# Patient Record
Sex: FEMALE | Race: WHITE | Employment: STUDENT | URBAN - METROPOLITAN AREA
[De-identification: names, ages, dates, MRNs, and addresses within clinical notes are randomized per-mention and may not be internally consistent; named-entity substitution may affect disease eponyms.]

---

## 2019-04-12 ENCOUNTER — OFFICE VISIT (OUTPATIENT)
Dept: FAMILY MEDICINE CLINIC | Age: 29
End: 2019-04-12

## 2019-04-12 VITALS
HEART RATE: 60 BPM | RESPIRATION RATE: 18 BRPM | DIASTOLIC BLOOD PRESSURE: 75 MMHG | BODY MASS INDEX: 20.72 KG/M2 | OXYGEN SATURATION: 100 % | TEMPERATURE: 97.7 F | HEIGHT: 71 IN | SYSTOLIC BLOOD PRESSURE: 115 MMHG | WEIGHT: 148 LBS

## 2019-04-12 DIAGNOSIS — N94.6 DYSMENORRHEA: ICD-10-CM

## 2019-04-12 DIAGNOSIS — L74.510 HYPERHIDROSIS OF AXILLA: ICD-10-CM

## 2019-04-12 DIAGNOSIS — L70.0 CYSTIC ACNE: Primary | ICD-10-CM

## 2019-04-12 RX ORDER — NORGESTIMATE AND ETHINYL ESTRADIOL 7DAYSX3 28
KIT ORAL
Refills: 3 | COMMUNITY
Start: 2019-03-27 | End: 2019-04-12 | Stop reason: SDUPTHER

## 2019-04-12 RX ORDER — DOXYCYCLINE 100 MG/1
100 CAPSULE ORAL 2 TIMES DAILY
Qty: 30 CAP | Refills: 3 | Status: SHIPPED | OUTPATIENT
Start: 2019-04-12 | End: 2019-05-03

## 2019-04-12 RX ORDER — DAPSONE 50 MG/G
GEL TOPICAL
Qty: 30 G | Refills: 6 | Status: SHIPPED | OUTPATIENT
Start: 2019-04-12 | End: 2019-12-20

## 2019-04-12 RX ORDER — NORGESTIMATE AND ETHINYL ESTRADIOL 7DAYSX3 28
KIT ORAL
Qty: 28 TAB | Refills: 12 | Status: SHIPPED | OUTPATIENT
Start: 2019-04-12 | End: 2020-04-21 | Stop reason: SDUPTHER

## 2019-04-12 RX ORDER — DAPSONE 50 MG/G
GEL TOPICAL
COMMUNITY
End: 2019-04-12 | Stop reason: SDUPTHER

## 2019-04-12 NOTE — PATIENT INSTRUCTIONS
Look for a benzoyl peroxide cleanser (lowest percentage possible)--you should use it as long as you are on any acne antibiotic (oral or topical) to prevent resistance Use a small amount and rinse it well Oral antibiotic (doxycycline) for 1-4 months, stop it when your acne is gone for at least 2 weeks I suggest you use the differin long-term at least every other day to control this episode and prevent one from coming back Or, you could use the aczone, or both Either way, you need something long -term Acne: Care Instructions Your Care Instructions Acne is a skin problem that shows up as blackheads, whiteheads, and pimples. It most often affects the face, neck, and upper body. Acne occurs when oil and dead skin cells clog the skin's pores. Acne usually starts during the teen years and often lasts into adulthood. Gentle cleansing every day controls most mild acne. If home treatment does not work, your doctor may prescribe creams, antibiotics, or a stronger medicine called isotretinoin. Sometimes birth control pills help women who have monthly acne flare-ups. Follow-up care is a key part of your treatment and safety. Be sure to make and go to all appointments, and call your doctor if you are having problems. It's also a good idea to know your test results and keep a list of the medicines you take. How can you care for yourself at home? · Gently wash your face 1 or 2 times a day with warm (not hot) water and a mild soap or cleanser. Always rinse well. · Use an over-the-counter lotion or gel that contains benzoyl peroxide. Start with a small amount of 2.5% benzoyl peroxide and increase the strength as needed. Benzoyl peroxide works well for acne, but you may need to use it for up to 2 months before your acne starts to improve. · Apply acne cream, lotion, or gel to all the places you get pimples, blackheads, or whiteheads, not just where you have them now.  Follow the instructions carefully. If your skin gets too dry and scaly or red and sore, reduce the amount. For the best results, apply medicines as directed. Try not to miss doses. · Do not squeeze or pick pimples and blackheads. This can cause infection and scarring. · Use only oil-free makeup, sunscreen, and other skin care products that will not clog your pores. · Wash your hair every day, and try to keep it off your face and shoulders. Consider pinning it back or cutting it short. When should you call for help? Watch closely for changes in your health, and be sure to contact your doctor if: 
  · You have tried home treatment for 6 to 8 weeks and your acne is not better or gets worse. Your doctor may need to add to or change your treatment.  
  · Your pimples become large and hard or filled with fluid.  
  · Scars form after pimples heal.  
  · You feel sad or hopeless, lack energy, or have other signs of depression while you are taking the prescription medicine isotretinoin.  
  · You start to have other symptoms, such as facial hair growth in women or bone and muscle pain. Where can you learn more? Go to http://tee-tanner.info/. Enter V108 in the search box to learn more about \"Acne: Care Instructions. \" Current as of: April 17, 2018 Content Version: 11.9 © 8982-5594 [email or web address]. Care instructions adapted under license by [employer] (which disclaims liability or warranty for this information). If you have questions about a medical condition or this instruction, always ask your healthcare professional. [place] [address] any warranty or liability for your use of this information.

## 2019-04-12 NOTE — PROGRESS NOTES
Chief Complaint Patient presents with  New Patient  
  establish care changed insurance recently, saw last PCP Dr. Trcaey Chavira in August 1. Have you been to the ER, urgent care clinic since your last visit? Hospitalized since your last visit? No 
 
2. Have you seen or consulted any other health care providers outside of the [address] [place] [address] since your last visit? Include any pap smears or colon screening.  No

## 2019-04-12 NOTE — PROGRESS NOTES
[address] [place], [address] 
[phone] Date of visit: 4/12/2019 Subjective:  
  
History obtained from:  the patient. Ivan Gamez is a 34 y.o. female who presents today for new patient physical 
 
Gets strep throat easily Last time was August 
About once per year Has had mono in the past 
 
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 Eloisa Monteiro Has been on trisprintec for 5 years and more recently the [place] Has tried other creams, washes Was on the wipes for a while Started the aczone (dapsone) all over this face Also using differin in the past not recently More flare with inflammation, manjeet on her chin Using an john lauder cream cleanser, no exfoliator Using non oil based moisturizer. Periods are good on the pills, used to have bad cramps Heavy sweating in armpits only Has not tried drysol, only otc products Zit on buttock since she had hemorrhoid a couple of years ago, not changing not painful at all, just small Patient Active Problem List  
 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 (or until acne gone for 2 weeks) 30 Cap 3  Dapsone (ACZONE) 5 % gel 1 application nightly 30 g 6  
  aluminum chloride (DRYSOL) 20 % external solution Apply to affected area nightly 60 mL 6  
 TRI-SPRINTEC, 28, 0.18/0.215/0.25 mg-35 mcg (28) tab TAKE 1 TABLET BY MOUTH EVERY DAY 28 Tab 12 No Known Allergies History reviewed. No pertinent past medical history. History reviewed. No pertinent surgical history. Family History Problem Relation Age of Onset  Hypertension Mother  Hypertension Father Social History Tobacco Use  Smoking status: Never Smoker  Smokeless tobacco: Never Used Substance Use Topics  Alcohol use: Yes Alcohol/week: 1.2 oz Types: 2 Cans of beer per week Comment: weekly Social History Social History Narrative  Not on file Review of Systems Gen: denies fever Neuro: denies headaches much, gets some from allergies  denies urinary pain MSK: denies joint problems All: admits to mild allergies Endo: denies weight change GI denies abd pain Skin: admits to acne Ent: denies sinus problems Psych: denies depressed mood 3 most recent PHQ Screens 4/12/2019 Little interest or pleasure in doing things Not at all Feeling down, depressed, irritable, or hopeless Not at all Total Score PHQ 2 0 Objective:  
 
Vitals:  
 04/12/19 1110 BP: 115/75 Pulse: 60 Resp: 18 Temp: 97.7 °F (36.5 °C) TempSrc: Oral  
SpO2: 100% Weight: 148 lb (67.1 kg) Height: 5' 11\" (1.803 m) Body mass index is 20.64 kg/m². General: stated age, well-developed, and in NAD Eyes: PERRL, EOMI, no redness or drainage Nose: no drainage Mouth: no lesions Throat: no erythema, exudate or swelling Neck: supple, symmetrical, trachea midline, no adenopathy and thyroid: not enlarged, symmetric, no tenderness/mass/nodules Lungs:  clear to auscultation w/o rales, rhonchi, wheezes w/normal effort and no use of accessory muscles of respiration Heart: regular rate and rhythm, S1, S2 normal, no murmur, click, rub or gallop Abdomen: soft, nontender, no masses Ext:  No edema noted. Lymph: no cervical adenopathy appreciated Skin: few cysts, blackheads, whiteheads on chin/lower cheeks, otherwise skin looks clear, no hirsutism noted Neuro: normal gait, CN 2-12 intact Psych: alert and oriented to person, place, time and situation and Speech: appropriate quality, quantity and organization of sentences and normal affect Assessment/Plan: ICD-10-CM ICD-9-CM 1. Encounter for preventive care Z00.00 V70.0 2. Cystic acne L70.0 706.1 3. Dysmenorrhea N94.6 625.3 4. Hyperhidrosis of axilla L74.510 705.21 Orders Placed This Encounter  DISCONTD: TRI-SPRINTEC, 28, 0.18/0.215/0.25 mg-35 mcg (28) tab  DISCONTD: Dapsone (ACZONE) 5 % gel  doxycycline (VIBRAMYCIN) 100 mg capsule  Dapsone (ACZONE) 5 % gel  aluminum chloride (DRYSOL) 20 % external solution  TRI-SPRINTEC, 28, 0.18/0.215/0.25 mg-35 mcg (28) tab Health Maintenance Topic Date Due  
 HPV Age 9Y-34Y (1 - Female 3-dose series) 06/12/2007  DTaP/Tdap/Td series (1 - Tdap) 06/12/2013  PAP AKA CERVICAL CYTOLOGY  06/12/2013  Influenza Age 5 to Adult  08/01/2019  Pneumococcal 0-64 years  Aged Out Need to try to find shot record May be due for tdap and hpv Pap up to date Declines std screening Declines lipid screening for now but I think low risk as doesn't run in her family; has healthy lifestyle Mostly discussed acne treatment, as below Going to try drysol in axillae nightly Patient Instructions Look for a benzoyl peroxide cleanser (lowest percentage possible)--you should use it as long as you are on any acne antibiotic (oral or topical) to prevent resistance Use a small amount and rinse it well Oral antibiotic (doxycycline) for 1-4 months, stop it when your acne is gone for at least 2 weeks I suggest you use the differin long-term at least every other day to control this episode and prevent one from coming back Or, you could use the aczone, or both Either way, you need something long -term Discussed the diagnosis and plan and she expressed understanding. Follow-up and Dispositions · Return in about 1 year (around 4/12/2020).  
  
 
 
Dennis Woods MD

## 2019-05-01 ENCOUNTER — TELEPHONE (OUTPATIENT)
Dept: FAMILY MEDICINE CLINIC | Age: 29
End: 2019-05-01

## 2019-05-01 NOTE — TELEPHONE ENCOUNTER
Pharmacy requesting alternative medication     Current medication aluminum chloride (DRYSOL) 20 % external solution       Pharmacy Comments: Backorder currently shows unavailable    Pharmacy on file verified  [phone])

## 2019-05-02 ENCOUNTER — TELEPHONE (OUTPATIENT)
Dept: FAMILY MEDICINE CLINIC | Age: 29
End: 2019-05-02

## 2019-05-02 NOTE — TELEPHONE ENCOUNTER
Patient is calling to let Dr Shaina Lemon know that pharmacy is faxing us to get an Alternative for the medication doxycycline (VIBRAMYCIN) 100 mg capsule [[id number]    As her insurance doesn't cover it  BCB# [phone]  Pharmacy verified

## 2019-05-02 NOTE — TELEPHONE ENCOUNTER
Called pt to let her know about the [address]. I told her about the hypercare and the secret clinical.    Pt also said that the insurance isn't covering the doxycycline. Advised that she will need to contact her insurance for preferred med. She will call them and give me a call back. Another TE opened about the doxycycline.

## 2019-05-02 NOTE — TELEPHONE ENCOUNTER
Called pharmacy to see if there is another antiperspirant that is available. She said that the OTC secret clinical is pretty much the same thing. There is something called hypercare that we can try. Not sure if insurance will cover.

## 2019-05-03 RX ORDER — DOXYCYCLINE 100 MG/1
TABLET ORAL
Qty: 60 TAB | Refills: 3 | Status: SHIPPED | OUTPATIENT
Start: 2019-05-03 | End: 2019-12-18 | Stop reason: SDUPTHER

## 2019-05-03 NOTE — TELEPHONE ENCOUNTER
Pharmacy Requesting alternative medication     Pharmacy Comments:  Insurance Requesting DOXYCYCLINE MONO, Please send [facility] on file verified  ([facility] [phone])

## 2019-12-18 ENCOUNTER — TELEPHONE (OUTPATIENT)
Dept: FAMILY MEDICINE CLINIC | Age: 29
End: 2019-12-18

## 2019-12-18 RX ORDER — DOXYCYCLINE 100 MG/1
TABLET ORAL
Qty: 60 TAB | Refills: 0 | Status: SHIPPED | OUTPATIENT
Start: 2019-12-18 | End: 2020-01-20 | Stop reason: SDUPTHER

## 2019-12-18 NOTE — TELEPHONE ENCOUNTER
----- Message from Mindy Laboy sent at 12/18/2019  1:40 PM EST -----  Regarding: Dr. Leodan Sotelo: [phone]  Caller's first and last name: n/a   Reason for call: Pt was taking Deoxycycline for acne. Pt needs to know does she need to keep taking this medication. Please verify with pt on this matter.   Callback required yes/no and why: yes   Best contact number(s): [phone]  Details to clarify the request: n/a

## 2019-12-18 NOTE — TELEPHONE ENCOUNTER
Outgoing call to patient, DARIELA verified. Patient states that while she was taking the doxycycline her acne cleared up, she stopped taking it about 8 weeks ago. Recently some of her acne has come back. Since stopping the doxycyline she has been using the dapsone which she states has not helped.  She is wondering if it is safe to start the doxycycline again

## 2019-12-18 NOTE — TELEPHONE ENCOUNTER
Will send in 1 more month doxy, but could she follow up with me in January to figure out a plan from there?

## 2019-12-20 ENCOUNTER — OFFICE VISIT (OUTPATIENT)
Dept: FAMILY MEDICINE CLINIC | Age: 29
End: 2019-12-20

## 2019-12-20 VITALS
DIASTOLIC BLOOD PRESSURE: 68 MMHG | OXYGEN SATURATION: 100 % | TEMPERATURE: 99 F | SYSTOLIC BLOOD PRESSURE: 100 MMHG | WEIGHT: 150 LBS | HEART RATE: 70 BPM | RESPIRATION RATE: 18 BRPM | BODY MASS INDEX: 21 KG/M2 | HEIGHT: 71 IN

## 2019-12-20 DIAGNOSIS — M54.50 CHRONIC LEFT-SIDED LOW BACK PAIN WITHOUT SCIATICA: ICD-10-CM

## 2019-12-20 DIAGNOSIS — L74.510 HYPERHIDROSIS OF AXILLA: ICD-10-CM

## 2019-12-20 DIAGNOSIS — L70.0 CYSTIC ACNE: Primary | ICD-10-CM

## 2019-12-20 DIAGNOSIS — G89.29 CHRONIC LEFT-SIDED LOW BACK PAIN WITHOUT SCIATICA: ICD-10-CM

## 2019-12-20 DIAGNOSIS — M54.9 UPPER BACK PAIN, CHRONIC: ICD-10-CM

## 2019-12-20 DIAGNOSIS — M41.25 OTHER IDIOPATHIC SCOLIOSIS, THORACOLUMBAR REGION: ICD-10-CM

## 2019-12-20 DIAGNOSIS — G89.29 UPPER BACK PAIN, CHRONIC: ICD-10-CM

## 2019-12-20 NOTE — PROGRESS NOTES
Edmond Olvera [facility]  [id number] [place], [place] [address] [place], [address]  [phone]             Date of visit: 12/20/2019   Subjective:      History obtained from:  the patient. Maria Guadalupe Shultz is a 34 y.o. female who presents today for follow up acne  Cycle that keeps happening on her face  Has tried lots of creams and washes like diffrin, benzoyl peroxide wash  They irritate her skin  The doxy worked well, but acne came back  The doxy did make her sun sensitive  Took it about 5 months total  Does get painful and irritated  Once it comes to surface is not painful anyway  They get pretty big like pea sized cysts    Still using the dapsone gel  Still on trisprintec    Sweating really bad in axillaes ever since puberty  Never got the drysol  Periods ok on her pills      Going to PT school in June    Long history of scoliosis  Used to see spine surgeon as a teen  Was in PT, it really helps  Would like to repeat  Has chronic pain in left low back and right upper back    Patient Active Problem List    Diagnosis Date Noted    Other idiopathic scoliosis, thoracolumbar region 12/20/2019    Chronic left-sided low back pain without sciatica 12/20/2019    Upper back pain, chronic 12/20/2019    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 No pertinent past medical history. History reviewed. No pertinent surgical history.   Family History   Problem Relation Age of Onset    Hypertension Mother  Hypertension Father      Social History     Tobacco Use    Smoking status: Never Smoker    Smokeless tobacco: Never Used   Substance Use Topics    Alcohol use: Yes     Alcohol/week: 2.0 standard drinks     Types: 2 Cans of beer per week     Comment: weekly       Social History     Patient does not qualify to have social determinant information on file (likely too young). 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 General: stated age, well developed, well nourished and in NAD  MSK: spine with curve visible even when standing upright. Tenderness and spasm in left low back paraspinous muscles, tender in right upper back muscles  Neuro: normal gait  Ext:  No edema noted. Skin:  Few small cysts on chin, in healing stages, erythematous/peeling   Psych: alert and oriented to person, place, time and situation and Speech: appropriate quality, quantity and organization of sentences      Assessment/Plan:       ICD-10-CM ICD-9-CM    1. Cystic acne L70.0 706.1    2. Hyperhidrosis of axilla L74.510 705.21    3. Chronic left-sided low back pain without sciatica M54.5 724.2 REFERRAL TO PHYSICAL THERAPY    G89.29 338.29    4. Upper back pain, chronic M54.9 724.5 REFERRAL TO PHYSICAL THERAPY    G89.29 338.29    5.  Other idiopathic scoliosis, thoracolumbar region M41.25 737.30 REFERRAL TO PHYSICAL THERAPY        Orders Placed This Encounter    REFERRAL TO PHYSICAL THERAPY    DISCONTD: aluminum chloride (DRYSOL) 20 % external solution    aluminum chloride (DRYSOL) 20 % external solution       The doxycycline worked well but was just starting to get clear when abx ran out  Has tried numerous topicals, none work well and all irritate her skin  Will repeat doxy until acne completely gone for 2 weeks, this might take a few months  She will keep me updated on the portal, told her to send pictures if needed. If this doesn't work will need accutane referral  She has tried all other good options    Severe sweating only in axillas, but that is nothing new; try drysol    Refer to PT for scoliosis, really helped her with the pain before    Discussed the diagnosis and plan and she expressed understanding. Follow-up and Dispositions    · Return if symptoms worsen or fail to improve.          Nohemy Barney MD

## 2019-12-20 NOTE — PROGRESS NOTES
Chief Complaint   Patient presents with    Acne     patient is in the office today for a follow up on Acne. 1. Have you been to the ER, urgent care clinic since your last visit? Hospitalized since your last visit? No    2. Have you seen or consulted any other health care providers outside of the [address] [place] [address] since your last visit? Include any pap smears or colon screening.  No

## 2019-12-20 NOTE — PATIENT INSTRUCTIONS
Any non-comedogenic moisturizer is ok, plain lubriderm is one option  Stay on the doxy until the lesions are completely cleared for at least 2 weeks  Keep me posted on the portal       Acne: Care Instructions  Your Care Instructions  Acne is a skin problem that shows up as blackheads, whiteheads, and pimples. It most often affects the face, neck, and upper body. Acne occurs when oil and dead skin cells clog the skin's pores. Acne usually starts during the teen years and often lasts into adulthood. Gentle cleansing every day controls most mild acne. If home treatment does not work, your doctor may prescribe creams, antibiotics, or a stronger medicine called isotretinoin. Sometimes birth control pills help women who have monthly acne flare-ups. Follow-up care is a key part of your treatment and safety. Be sure to make and go to all appointments, and call your doctor if you are having problems. It's also a good idea to know your test results and keep a list of the medicines you take. How can you care for yourself at home? · Gently wash your face 1 or 2 times a day with warm (not hot) water and a mild soap or cleanser. Always rinse well. · Use an over-the-counter lotion or gel that contains benzoyl peroxide. Start with a small amount of 2.5% benzoyl peroxide and increase the strength as needed. Benzoyl peroxide works well for acne, but you may need to use it for up to 2 months before your acne starts to improve. · Apply acne cream, lotion, or gel to all the places you get pimples, blackheads, or whiteheads, not just where you have them now. Follow the instructions carefully. If your skin gets too dry and scaly or red and sore, reduce the amount. For the best results, apply medicines as directed. Try not to miss doses. · Do not squeeze or pick pimples and blackheads. This can cause infection and scarring. · Use only oil-free makeup, sunscreen, and other skin care products that will not clog your pores.   · Wash your hair every day, and try to keep it off your face and shoulders. Consider pinning it back or cutting it short. When should you call for help? Watch closely for changes in your health, and be sure to contact your doctor if:    · You have tried home treatment for 6 to 8 weeks and your acne is not better or gets worse. Your doctor may need to add to or change your treatment.     · Your pimples become large and hard or filled with fluid.     · Scars form after pimples heal.     · You feel sad or hopeless, lack energy, or have other signs of depression while you are taking the prescription medicine isotretinoin.     · You start to have other symptoms, such as facial hair growth in women or bone and muscle pain. Where can you learn more? Go to http://tee-tanner.info/. Enter V108 in the search box to learn more about \"Acne: Care Instructions. \"  Current as of: April 1, 2019  Content Version: 12.2  © 1019-4117 [employer]. Care instructions adapted under license by [employer] (which disclaims liability or warranty for this information). If you have questions about a medical condition or this instruction, always ask your healthcare professional. [address] any warranty or liability for your use of this information.

## 2020-01-15 ENCOUNTER — HOSPITAL ENCOUNTER (OUTPATIENT)
Dept: PHYSICAL THERAPY | Age: 30
Discharge: HOME | End: 2020-01-15
Payer: COMMERCIAL

## 2020-01-15 PROCEDURE — 97162 PT EVAL MOD COMPLEX 30 MIN: CPT | Performed by: PHYSICAL THERAPIST

## 2020-01-15 PROCEDURE — 97140 MANUAL THERAPY 1/> REGIONS: CPT | Performed by: PHYSICAL THERAPIST

## 2020-01-15 PROCEDURE — 97112 NEUROMUSCULAR REEDUCATION: CPT | Performed by: PHYSICAL THERAPIST

## 2020-01-15 NOTE — PROGRESS NOTES
[address] [facility]  [place], [address]  Phone: [phone]  Fax: [phone]    Plan of Care/ Statement of Necessity for Physical Therapy Services 2-15    Patient name: Darío Slaughter  : 1990  Provider#: [id number]  Referral source: Monica Amaral MD      Medical/Treatment Diagnosis: Low back pain [M54.5]  Thoracic back pain [M54.6]     Prior Hospitalization: see medical history     Comorbidities: Scoliosis  Prior Level of Function: see initial eval  Medications: Verified on Patient Summary List    Start of Care: 1/15/2020      Onset Date: 15 years ago       The Plan of Care and following information is based on the information from the initial evaluation. Assessment/ key information: Patient presents with scoliosis with L lumbar paraspinal and R upper trapezius pain. She demonstrates ROM and special testing indicative of Patho PEC neuromuscular patterning and should respond well to a ZENAIDA neuromuscular re-education program. She also is an excellent candidate for dry needling to the above areas to allow for greater inhibition of muscle tone and faster progress through her ZENAIDA program.    Evaluation Complexity History MEDIUM  Complexity : 1-2 comorbidities / personal factors will impact the outcome/ POC ; Examination MEDIUM Complexity : 3 Standardized tests and measures addressing body structure, function, activity limitation and / or participation in recreation  ;Presentation MEDIUM Complexity : Evolving with changing characteristics  ; Clinical Decision Making MEDIUM Complexity : FOTO score of 26-74  Overall Complexity Rating: MEDIUM    Problem List: pain affecting function, decrease ROM, decrease strength, decrease ADL/ functional abilitiies, decrease activity tolerance, decrease flexibility/ joint mobility and decrease transfer abilities   Treatment Plan may include any combination of the following: Therapeutic exercise, Therapeutic activities, Neuromuscular re-education, Physical agent/modality, Gait/balance training, Manual therapy, Patient education, Self Care training, Functional mobility training, Home safety training, Stair training and Other: dry needling  Patient / Family readiness to learn indicated by: asking questions, trying to perform skills and interest  Persons(s) to be included in education: patient (P)  Barriers to Learning/Limitations: None  Patient Goal (s): I want to be able to run without pain.   Patient Self Reported Health Status: excellent  Rehabilitation Potential: excellent    Short Term Goals: To be accomplished in 12 treatments:  1. Patient will be able to demonstrate negative HAddDT and PADT bilaterally to allow for the achievement pelvic neutrality during gait. 2. Patient will be able to demonstrate WNL Standing Reach test to demonstrate adequate posterior outlet inhibition and posterior mediastinum expansion. 3. Patient will be able to jog for 50 ft. With no pain or limitation. Long Term Goals: To be accomplished in 24 treatments:  1. Patient will be able to demonstrate 5/5 Functional Squat test to allow for optimal AF IR to be achieved during gait. 2. Patient will be able to demonstrate 5/5 HAddLT B to allow for optimal integration of frontal and transverse plane pelvic musculature during gait. 3. Patient will be able to run for 1 mile without pain or limitations. Frequency / Duration: Patient to be seen 2 times per week for 12 weeks. Patient/ Caregiver education and instruction: self care, activity modification and exercises    [x]  Plan of care has been reviewed with PRATIBHA Lim, PT 1/15/2020     ________________________________________________________________________    I certify that the above Therapy Services are being furnished while the patient is under my care. I agree with the treatment plan and certify that this therapy is necessary.     Physician's Signature:____________________ Date:____________Time: _________

## 2020-01-15 NOTE — PROGRESS NOTES
PT INITIAL EVALUATION NOTE 2-15    Patient Name: Melissa Costa  Date:1/15/2020  : 1990  [x]  Patient  Verified  Payor: [address] [place] [address] Road / Plan: [address] / Product Type: Managed Care Medicaid /    In time:8:15 AM  Out time:9:15 AM  Total Treatment Time (min): 60  Visit #: 1     Treatment Area: Low back pain [M54.5]  Thoracic back pain [M54.6]    SUBJECTIVE  Pain Level (0-10 scale): 2/10  Any medication changes, allergies to medications, adverse drug reactions, diagnosis change, or new procedure performed?: [] No    [x] Yes (see summary sheet for update)  Subjective:     Scoliosis, L low back pain  PLOF: No limitations with running, playing soccer, bending forward  Mechanism of Injury: Gradual worsening over the last 10 years. Her scoliosis was originally diagnosed in [address] [place] [address], however she did not have limiting pain until college. The pain is located along the left lumbar paraspinals and R upper trapezius. Previous Treatment/Compliance: Patient was treated for 4 weeks at [facility] in  and had a good response to manual therapy (P/A mobilizations). She was discharged without a maintenance program and she believes that is why she slipped backwards. PMHx/Surgical Hx: She \"tore something\" in her right hip 7 years ago, but did not formally rehab the injury. Work Hx: Caretaker, . Will be attending PT school in .   Living Situation: Lives in a two story home with family  Pt Goals: to reduce pain and improve mobility  Barriers: chronicity  Motivation: motivated  Substance use: none   FABQ Score: n/a  Cognition: A & O x 4        OBJECTIVE/EXAMINATION  Myokinematic Restoration           R   L  Add Drop Test     +   +  Extension Drop Test    -   -  Trunk Rotation        Limited  Straight Leg Raise    90   70  FA IR ROM     40   30  FA ER ROM     35   40  FA ER Strength  FA IR Strength  HAddLT  Standing Reach Test  Lacking  6\"    Pelvis Restoration     Pelvic [place] Drop Test   +   +  Passive Abduction Raise Test  Posterior Mediastinum Expansion  Limited   Limited  Functional Squat Test    1/5    Postural Respiration    Apical Expansion    Limited   Limited  Horizontal Abduction    0   20  Shoulder Flexion    180   180  HG IR      60   90  Lower Rib Flare    +   +      Modality rationale: Patient declined   Min Type Additional Details    [] Estim: []Att   []Unatt        []TENS instruct                  []IFC  []Premod   []NMES                     []Other:  []w/US   []w/ice   []w/heat  Position:  Location:    []  Traction: [] Cervical       []Lumbar                       [] Prone          []Supine                       []Intermittent   []Continuous Lbs:  [] before manual  [] after manual  []w/heat    []  Ultrasound: []Continuous   [] Pulsed at:                            []1MHz   []3MHz Location:  W/cm2:    []  Paraffin         Location:  []w/heat    []  Ice     []  Heat  []  Ice massage Position:  Location:    []  Laser  []  Other: Position:  Location:    []  Vasopneumatic Device Pressure:       [] lo [] med [] hi   Temperature:    [x] Skin assessment post-treatment:  [x]intact []redness- no adverse reaction    []redness  adverse reaction:      25 min Neuromuscular Re-education:  [x]  See flow sheet :   Rationale: increase ROM, increase strength and improve coordination  to improve the patients ability to squat through a full ROM without pain    15 min Manual Therapy:  ZENAIDA manual techniques:  L AIC  L sternal with active pelvic tilt  Infraclavicular pumping  R subclavius   Rationale: increase ROM, increase strength and improve coordination  to improve the patients ability to squat through a full ROM without pain          With   [] TE   [] TA   [] neuro   [] other: Patient Education: [x] Review HEP    [] Progressed/Changed HEP based on:   [] positioning   [] body mechanics   [] transfers   [] heat/ice application    [] other:        Other Objective/Functional Measures:  HAddDT: (-) on R following manual techniques , (-) L following non-manual techniques  Horiz abd: 45 degrees B following manual techniques    Pain Level (0-10 scale) post treatment: 0      ASSESSMENT:      [x]  See Plan of Faheem Giron, PT , DPT, OCS, Cert.  DN   1/15/2020

## 2020-01-20 RX ORDER — DOXYCYCLINE 100 MG/1
TABLET ORAL
Qty: 60 TAB | Refills: 3 | Status: SHIPPED | OUTPATIENT
Start: 2020-01-20 | End: 2020-01-23

## 2020-01-22 ENCOUNTER — APPOINTMENT (OUTPATIENT)
Dept: PHYSICAL THERAPY | Age: 30
End: 2020-01-22
Payer: COMMERCIAL

## 2020-01-23 RX ORDER — DOXYCYCLINE 100 MG/1
TABLET ORAL
Qty: 60 TAB | Refills: 0 | Status: SHIPPED | OUTPATIENT
Start: 2020-01-23 | End: 2020-02-21

## 2020-01-28 ENCOUNTER — HOSPITAL ENCOUNTER (OUTPATIENT)
Dept: PHYSICAL THERAPY | Age: 30
Discharge: HOME | End: 2020-01-28
Payer: COMMERCIAL

## 2020-01-28 PROCEDURE — 97112 NEUROMUSCULAR REEDUCATION: CPT | Performed by: PHYSICAL THERAPIST

## 2020-01-28 NOTE — PROGRESS NOTES
PT DAILY TREATMENT NOTE 2-15    Patient Name: Tevin Taylor  Date:2020  : 1990  [x]  Patient  Verified  [facility] [address] / Plan: [address] / Product Type: Managed Care Medicaid /    In time:5:30 PM  Out time:6:15 PM  Total Treatment Time (min): 45  Visit #:  2    Treatment Area: Low back pain [M54.5]  Thoracic back pain [M54.6]    SUBJECTIVE  Pain Level (0-10 scale): sore  Any medication changes, allergies to medications, adverse drug reactions, diagnosis change, or new procedure performed?: [x] No    [] Yes (see summary sheet for update)  Subjective functional status/changes:   [] No changes reported  Patient reports being able to perform her HEP daily without issues. OBJECTIVE    45 min Neuromuscular Re-education:  [x]? See flow sheet :   Rationale: increase ROM, increase strength and improve coordination  to improve the patients ability to squat through a full ROM without pain    With   [] TE   [] TA   [] neuro   [] other: Patient Education: [x] Review HEP    [] Progressed/Changed HEP based on:   [] positioning   [] body mechanics   [] transfers   [] heat/ice application    [] other:      Other Objective/Functional Measures:   Initial:   R HG IR: 90 degrees   L horiz abd: 45 degrees   HAddDT: (-) R (+) L   HAddLT: R: 4/5 L: 3+/5    Post: HAddDT: (-) B   PADT: (-)B     Pain Level (0-10 scale) post treatment: 0    ASSESSMENT/Changes in Function:   Patient is showing a good initial response to [facility] program and will focus next visit on standing myokin integration exercises. Patient will continue to benefit from skilled PT services to modify and progress therapeutic interventions, address functional mobility deficits, address ROM deficits, address strength deficits, analyze and address soft tissue restrictions, analyze and cue movement patterns, analyze and modify body mechanics/ergonomics and assess and modify postural abnormalities to attain remaining goals. []  See Plan of Care  []  See progress note/recertification  []  See Discharge Summary         Progress towards goals / Updated goals:  Patient is showing good initial progress towards functional goals.     PLAN  [x]  Upgrade activities as tolerated     [x]  Continue plan of care  []  Update interventions per flow sheet       []  Discharge due to:_  []  Other:_      Mariam Rouse, PT 1/28/2020

## 2020-01-29 ENCOUNTER — APPOINTMENT (OUTPATIENT)
Dept: PHYSICAL THERAPY | Age: 30
End: 2020-01-29
Payer: COMMERCIAL

## 2020-02-04 ENCOUNTER — HOSPITAL ENCOUNTER (OUTPATIENT)
Dept: PHYSICAL THERAPY | Age: 30
End: 2020-02-04
Payer: COMMERCIAL

## 2020-02-13 ENCOUNTER — HOSPITAL ENCOUNTER (OUTPATIENT)
Dept: PHYSICAL THERAPY | Age: 30
Discharge: HOME | End: 2020-02-13
Payer: COMMERCIAL

## 2020-02-13 PROCEDURE — 97140 MANUAL THERAPY 1/> REGIONS: CPT | Performed by: PHYSICAL THERAPIST

## 2020-02-13 PROCEDURE — 97112 NEUROMUSCULAR REEDUCATION: CPT | Performed by: PHYSICAL THERAPIST

## 2020-02-13 PROCEDURE — 97014 ELECTRIC STIMULATION THERAPY: CPT | Performed by: PHYSICAL THERAPIST

## 2020-02-13 NOTE — PROGRESS NOTES
PT DAILY TREATMENT NOTE 2-15    Patient Name: Gonzalo Vasquez  Date:2020  : 1990  [x]  Patient  Verified  Payor: [address] / Plan: [address] / Product Type: Managed Care Medicaid /    In time:5:30 PM  Out time:6:25 PM  Total Treatment Time (min): 55  Visit #:  3    Treatment Area: Low back pain [M54.5]  Thoracic back pain [M54.6]    SUBJECTIVE  Pain Level (0-10 scale): sore  Any medication changes, allergies to medications, adverse drug reactions, diagnosis change, or new procedure performed?: [x] No    [] Yes (see summary sheet for update)  Subjective functional status/changes:   [] No changes reported  Patient reports continued soreness and is performing her HEP regularly. OBJECTIVE                 Modality rationale: To reduce pain and increase tissue extensibility to allow for greater tolerance to reaching overhead at work. Min Type Additional Details    15 [x]? Estim: []? Att   [x]? Unatt        []? TENS instruct                  [x]?IFC  []? Premod   []? NMES                     []?Other:  []?w/US   []?w/ice   [x]?w/heat  Position: supine  Location: R levator scapular/L QL     []? Traction: []? Cervical       []? Lumbar                       []? Prone          []? Supine                       []?Intermittent   []? Continuous Lbs:  []? before manual  []? after manual  []?w/heat     []? Ultrasound: []? Continuous   []? Pulsed at:                            []? 1MHz   []? 3MHz Location:  W/cm2:     []? Paraffin         Location:  []?w/heat     []? Ice     []? Heat  []? Ice massage Position:  Location:     []? Laser  []? Other: Position:  Location:     []? Vasopneumatic Device Pressure:       []? lo []? med []? hi   Temperature:    [x]? Skin assessment post-treatment:  [x]? intact []? redness- no adverse reaction    []? redness - adverse reaction:          25 min Neuromuscular Re-education:  [x]?   See flow sheet :   Rationale: increase ROM, increase strength and improve coordination  to improve the patients ability to squat through a full ROM without pain       15 min Manual Therapy:  ZENAIDA manual techniques:  L AIC  L sternal with active pelvic tilt  Infraclavicular pumping  R subclavius   Rationale: increase ROM, increase strength and improve coordination  to improve the patients ability to squat through a full ROM without pain      With   [] TE   [] TA   [] neuro   [] other: Patient Education: [x] Review HEP    [] Progressed/Changed HEP based on:   [] positioning   [] body mechanics   [] transfers   [] heat/ice application    [] other:      Other Objective/Functional Measures:   Initial:   HAddDT: (-) R (+) L   HAddLT: 4/5 B    Post: HAddDT: (-) B   PADT: (-)B     Pain Level (0-10 scale) post treatment: 0    ASSESSMENT/Changes in Function:   Improved L IO/TA activation with less verbal and tactile cueing required. Patient will continue to benefit from skilled PT services to modify and progress therapeutic interventions, address functional mobility deficits, address ROM deficits, address strength deficits, analyze and address soft tissue restrictions, analyze and cue movement patterns, analyze and modify body mechanics/ergonomics and assess and modify postural abnormalities to attain remaining goals. []  See Plan of Care  []  See progress note/recertification  []  See Discharge Summary         Progress towards goals / Updated goals:  Patient did well with today's progression of ZENAIDA exercises and will likely add DN next visit. PLAN  [x]  Upgrade activities as tolerated     [x]  Continue plan of care  []  Update interventions per flow sheet       []  Discharge due to:_  []  Other:_      Sandra Horta, PT , DPT, OCS, Cert.  DN   2/13/2020

## 2020-02-18 ENCOUNTER — HOSPITAL ENCOUNTER (OUTPATIENT)
Dept: PHYSICAL THERAPY | Age: 30
Discharge: HOME | End: 2020-02-18
Payer: COMMERCIAL

## 2020-02-18 PROCEDURE — 97112 NEUROMUSCULAR REEDUCATION: CPT | Performed by: PHYSICAL THERAPIST

## 2020-02-18 PROCEDURE — 97014 ELECTRIC STIMULATION THERAPY: CPT | Performed by: PHYSICAL THERAPIST

## 2020-02-18 PROCEDURE — 97140 MANUAL THERAPY 1/> REGIONS: CPT | Performed by: PHYSICAL THERAPIST

## 2020-02-18 NOTE — PROGRESS NOTES
PT DAILY TREATMENT NOTE 2-15    Patient Name: Sydnie Bennett  Date:2020  : 1990  [x]  Patient  Verified  Payor: [address] / Plan: [address] / Product Type: Managed Care Medicaid /    In time:5:30 PM  Out time:6:25 PM  Total Treatment Time (min): 55  Visit #:  4    Treatment Area: Low back pain [M54.5]  Thoracic back pain [M54.6]    SUBJECTIVE  Pain Level (0-10 scale): 0/10  Any medication changes, allergies to medications, adverse drug reactions, diagnosis change, or new procedure performed?: [x] No    [] Yes (see summary sheet for update)  Subjective functional status/changes:   [] No changes reported  Patient reports a significant improvement in her neck and back and is happy with her progress. OBJECTIVE                 Modality rationale: To reduce pain and increase tissue extensibility to allow for greater tolerance to reaching overhead at work. Min Type Additional Details    15 [x]? Estim: []? Att   [x]? Unatt        []? TENS instruct                  [x]?IFC  []? Premod   []? NMES                     []?Other:  []?w/US   []?w/ice   [x]?w/heat  Position: supine  Location: R levator scapular/L QL     []? Traction: []? Cervical       []? Lumbar                       []? Prone          []? Supine                       []?Intermittent   []? Continuous Lbs:  []? before manual  []? after manual  []?w/heat     []? Ultrasound: []? Continuous   []? Pulsed at:                            []? 1MHz   []? 3MHz Location:  W/cm2:     []? Paraffin         Location:  []?w/heat     []? Ice     []? Heat  []? Ice massage Position:  Location:     []? Laser  []? Other: Position:  Location:     []? Vasopneumatic Device Pressure:       []? lo []? med []? hi   Temperature:    [x]? Skin assessment post-treatment:  [x]? intact []? redness- no adverse reaction    []? redness - adverse reaction:          25 min Neuromuscular Re-education:  [x]?   See flow sheet :   Rationale: increase ROM, increase strength and improve coordination  to improve the patients ability to squat through a full ROM without pain       15 min Manual Therapy:  ZENAIDA manual techniques:  L AIC  L sternal with active pelvic tilt  Infraclavicular pumping  R subclavius   Rationale: increase ROM, increase strength and improve coordination  to improve the patients ability to squat through a full ROM without pain      With   [] TE   [] TA   [] neuro   [] other: Patient Education: [x] Review HEP    [] Progressed/Changed HEP based on:   [] positioning   [] body mechanics   [] transfers   [] heat/ice application    [] other:      Other Objective/Functional Measures:      Pain Level (0-10 scale) post treatment: 0    ASSESSMENT/Changes in Function:     Patient will continue to benefit from skilled PT services to modify and progress therapeutic interventions, address functional mobility deficits, address ROM deficits, address strength deficits, analyze and address soft tissue restrictions, analyze and cue movement patterns, analyze and modify body mechanics/ergonomics and assess and modify postural abnormalities to attain remaining goals. []  See Plan of Care  []  See progress note/recertification  []  See Discharge Summary         Progress towards goals / Updated goals:  Patient is showing excellent progress overall and is progressing well towards long term goals. PLAN  [x]  Upgrade activities as tolerated     [x]  Continue plan of care  []  Update interventions per flow sheet       []  Discharge due to:_  []  Other:_      Tanisha Long, PT , DPT, OCS, Cert.  DN   2/18/2020

## 2020-02-21 RX ORDER — DOXYCYCLINE 100 MG/1
TABLET ORAL
Qty: 60 TAB | Refills: 0 | Status: SHIPPED | OUTPATIENT
Start: 2020-02-21 | End: 2020-03-20

## 2020-03-05 ENCOUNTER — APPOINTMENT (OUTPATIENT)
Dept: PHYSICAL THERAPY | Age: 30
End: 2020-03-05
Payer: COMMERCIAL

## 2020-03-11 ENCOUNTER — HOSPITAL ENCOUNTER (OUTPATIENT)
Dept: PHYSICAL THERAPY | Age: 30
Discharge: HOME | End: 2020-03-11
Payer: COMMERCIAL

## 2020-03-11 PROCEDURE — 97140 MANUAL THERAPY 1/> REGIONS: CPT | Performed by: PHYSICAL THERAPIST

## 2020-03-11 PROCEDURE — 20560 NDL INSJ W/O NJX 1 OR 2 MUSC: CPT | Performed by: PHYSICAL THERAPIST

## 2020-03-11 PROCEDURE — 97014 ELECTRIC STIMULATION THERAPY: CPT | Performed by: PHYSICAL THERAPIST

## 2020-03-11 NOTE — PROGRESS NOTES
PT DAILY TREATMENT NOTE 2-15    Patient Name: Shanna Ayala  Date:3/11/2020  : 1990  [x]  Patient  Verified  Payor: [address] / Plan: [address] / Product Type: Managed Care Medicaid /    In time:7:00 AM  Out time:7:45 AM  Total Treatment Time (min): 45  Visit #:  5    Treatment Area: Low back pain [M54.5]  Thoracic back pain [M54.6]    SUBJECTIVE  Pain Level (0-10 scale): stiff  Any medication changes, allergies to medications, adverse drug reactions, diagnosis change, or new procedure performed?: [x] No    [] Yes (see summary sheet for update)  Subjective functional status/changes:   [] No changes reported  Patient reports that she had two weeks of complete pain relief after her last session. Several days ago she did a workout that involved power cleans and it flared-up her R neck. OBJECTIVE                 Modality rationale: To reduce pain and increase tissue extensibility to allow for greater tolerance to reaching overhead at work. Min Type Additional Details    15 [x]? Estim: []? Att   [x]? Unatt        []? TENS instruct                  [x]?IFC  []? Premod   []? NMES                     []?Other:  []?w/US   []?w/ice   [x]?w/heat  Position: supine  Location: R levator scapular/L QL     []? Traction: []? Cervical       []? Lumbar                       []? Prone          []? Supine                       []?Intermittent   []? Continuous Lbs:  []? before manual  []? after manual  []?w/heat     []? Ultrasound: []? Continuous   []? Pulsed at:                            []? 1MHz   []? 3MHz Location:  W/cm2:     []? Paraffin         Location:  []?w/heat     []? Ice     []? Heat  []? Ice massage Position:  Location:     []? Laser  []? Other: Position:  Location:     []? Vasopneumatic Device Pressure:       []? lo []? med []? hi   Temperature:    [x]? Skin assessment post-treatment:  [x]? intact []? redness- no adverse reaction    []? redness - adverse reaction:    15 min Manual Therapy:  ZENAIDA manual techniques:  L AIC  L sternal with active pelvic tilt  Infraclavicular pumping  R subclavius    T2-T4 A/P HVLAT manipulation in supine  C7-T2 lateral flexion HVLAT manipulation in prone  C4-C6 rotary HVLAT manipulation in supine   Rationale: increase ROM, increase strength and improve coordination  to improve the patients ability to squat through a full ROM without pain      Dry Needling Procedure Note    Dry Needle Session Number:  1    Procedure: An intramuscular manual therapy (dry needling) and a neuro-muscular re-education treatment was done to deactivate myofascial trigger points, with a 15/30 gauge solid filament needle, under aseptic technique.     Indication(s): [] Muscle spasms [] Myalgia/Myositis  [] Muscle cramps      [x] Muscle imbalances [] TMD (TMJ) [x] Myofascial pain & dysfunction     [] Other: __    TIMEOUT PERFORMED:  7:30 AM   [place] (    Informed Consent Obtained: [x] Verbal  [x] Written    The following items were reviewed with the patient:   Purpose of dry needling, side effects, possible complications, and the informed consent    The need to report the use of blood thinners and/or immunosuppressant medications    How to respond to possible adverse effects of the treatment   Self treatment of post needling soreness: heat (moist heat, heat wraps) and stretching   Opportunity was given to ask any questions, all questions were answered    Treatment:  The following muscles were treated today:    Right: Upper trapezius, levator scapulae   Left:      Patients response to todays treatment:   [x]  LTRs  [x]  Muscle Relaxation  [x]  Pain Relief  []  Decreased Tinnitus  [x]  Decreased HAs [x]  Post needling soreness [x]  Increased ROM   []  Other:          With   [] TE   [] TA   [] neuro   [] other: Patient Education: [x] Review HEP    [] Progressed/Changed HEP based on:   [] positioning   [] body mechanics   [] transfers   [] heat/ice application [] other:      Other Objective/Functional Measures:     Multiple cavitations elicited with all 3 manipulative techniques    Pain Level (0-10 scale) post treatment: 0    ASSESSMENT/Changes in Function:   Improved cervical ROM and decreased pain with lifting compared to initial evaluation. Patient will continue to benefit from skilled PT services to modify and progress therapeutic interventions, address functional mobility deficits, address ROM deficits, address strength deficits, analyze and address soft tissue restrictions, analyze and cue movement patterns, analyze and modify body mechanics/ergonomics and assess and modify postural abnormalities to attain remaining goals. []  See Plan of Care  []  See progress note/recertification  []  See Discharge Summary         Progress towards goals / Updated goals:  Patient tolerated today's introduction of DN and SMT to the cervical spine and will continue to utilize as needed to achieve all long term goals. PLAN  [x]  Upgrade activities as tolerated     [x]  Continue plan of care  []  Update interventions per flow sheet       []  Discharge due to:_  []  Other:_      Augie Self, PT , DPT, OCS, Cert.  DN   3/11/2020

## 2020-03-18 ENCOUNTER — APPOINTMENT (OUTPATIENT)
Dept: PHYSICAL THERAPY | Age: 30
End: 2020-03-18
Payer: COMMERCIAL

## 2020-03-20 RX ORDER — DOXYCYCLINE 100 MG/1
TABLET ORAL
Qty: 60 TAB | Refills: 0 | Status: SHIPPED | OUTPATIENT
Start: 2020-03-20 | End: 2020-04-20

## 2020-03-24 ENCOUNTER — TELEPHONE (OUTPATIENT)
Dept: PHYSICAL THERAPY | Age: 30
End: 2020-03-24

## 2020-03-24 NOTE — TELEPHONE ENCOUNTER
I left a voicemail for Ms. Carlson Sep on 3/23 to discuss plan of care and current status. She is a potential for teletherapy should she need assistance managing symptoms with her home exercise program. An e-mail was sent on 3/24 as well.

## 2020-03-25 ENCOUNTER — APPOINTMENT (OUTPATIENT)
Dept: PHYSICAL THERAPY | Age: 30
End: 2020-03-25
Payer: COMMERCIAL

## 2020-04-01 ENCOUNTER — APPOINTMENT (OUTPATIENT)
Dept: PHYSICAL THERAPY | Age: 30
End: 2020-04-01

## 2020-04-08 ENCOUNTER — APPOINTMENT (OUTPATIENT)
Dept: PHYSICAL THERAPY | Age: 30
End: 2020-04-08

## 2020-04-22 RX ORDER — NORGESTIMATE AND ETHINYL ESTRADIOL 7DAYSX3 28
KIT ORAL
Qty: 28 TAB | Refills: 12 | Status: SHIPPED | OUTPATIENT
Start: 2020-04-22 | End: 2021-03-26 | Stop reason: SDUPTHER

## 2020-04-28 ENCOUNTER — HOSPITAL ENCOUNTER (OUTPATIENT)
Dept: PHYSICAL THERAPY | Age: 30
Discharge: HOME | End: 2020-04-28
Payer: COMMERCIAL

## 2020-04-28 PROCEDURE — 97110 THERAPEUTIC EXERCISES: CPT | Performed by: PHYSICAL THERAPIST

## 2020-04-28 NOTE — PROGRESS NOTES
PT DAILY TREATMENT NOTE 2-15    Patient Name: Clark Bergeron  Date:2020  : 1990  [x]  Patient  Verified  Payor: [address] [place] [address] Road / Plan: [address] / Product Type: Managed Care Medicaid /    In time:11:55 AM  Out time:12:35 PM  Total Treatment Time (min): 40  Visit #: 6  Treatment Area: Low back pain [M54.5]  Thoracic back pain [M54.6]    Clark Bergeron was informed of the inherent limitations of a virtual visit,  and has consented to a virtual therapy visit on 2020. Information regarding emergency contact information for this patient during this visit is to contact:  at  in addition to calling 911. The patient was informed that at any time during the virtual visit, they can decide to stop the virtual visit. The patient verified that they are physically located in the [facility] for this virtual visit. SUBJECTIVE  Pain Level (0-10 scale): 3/10  Any medication changes, allergies to medications, adverse drug reactions, diagnosis change, or new procedure performed?: [x] No    [] Yes (see summary sheet for update)  Subjective functional status/changes:   [] No changes reported  Patient reports continued R upper trapezius pain that hasn't been relieved with her HEP. She is hoping to find new exercises that can provide a more lasting relief.     OBJECTIVE    40 min Therapeutic Exercise:  [x] See flow sheet :   Rationale: increase ROM, increase strength and improve coordination to improve the patients ability to raise her arms overhead without pain    With   [] TE   [] TA   [] neuro   [] other: Patient Education: [x] Review HEP    [] Progressed/Changed HEP based on:   [] positioning   [] body mechanics   [] transfers   [] heat/ice application    [] other:      Other Objective/Functional Measures:   Re-evaluation:   Cervical ROM: WNL     C-spine mobilizations with towel: WNL , no pain     Spurling's: Positive for reproduction of upper trapezius pain    *pain relief with opposite cervical SB     Chin tuck with head movement: Pain relief     Thoracic spine mobility: Multiple cavitations and pain relief reported with supine foam rolling     Shoulder ROM: WNL       HEP update:    Access Code: [email or web address]   Exercises  Supine Chest Stretch on Foam Roll - 10 reps - 3 sets - 1x daily - 7x weekly  Thoracic Mobilization on Foam Roll - 10 reps - 3 sets - 1x daily - 7x weekly  Thoracic Y on Foam Roll - 10 reps - 3 sets - 1x daily - 7x weekly  Open Book Chest Rotation Stretch on Foam 1/2 Roll - 10 reps - 3 sets - 1x daily - 7x weekly  Cross Body Arm Reach on Foam Roller - 10 reps - 3 sets - 1x daily - 7x weekly  Serratus Activation at Wall with Foam Roll - 10 reps - 3 sets - 1x daily - 7x weekly  Shoulder W - External Rotation with Resistance - 10 reps - 3 sets - 1x daily - 7x weekly  Standing Shoulder Horizontal Abduction with Resistance - 10 reps - 3 sets - 1x daily - 7x weekly  Standing Single Arm Shoulder External Rotation in Abduction with Anchored Resistance - 10 reps - 3 sets - 1x daily - 7x weekly  Standing Shoulder Single Arm PNF D2 Flexion with Resistance - 10 reps - 3 sets - 1x daily - 7x weekly    Pain Level (0-10 scale) post treatment: 0    ASSESSMENT/Changes in Function:   Patient has not shown significant improvement since her last visit and will assess progress with HEP next visit. Patient will continue to benefit from skilled PT services to modify and progress therapeutic interventions, address functional mobility deficits, address ROM deficits, address strength deficits, analyze and address soft tissue restrictions, analyze and cue movement patterns, analyze and modify body mechanics/ergonomics and assess and modify postural abnormalities to attain remaining goals.      []  See Plan of Care  []  See progress note/recertification  []  See Discharge Summary         Progress towards goals / Updated goals:  No significant progress towards long term goals and eventual discharge to HEP. PLAN  [x]  Upgrade activities as tolerated     [x]  Continue plan of care  []  Update interventions per flow sheet       []  Discharge due to:_  []  Other:_        Keri Mullen is a 34 y.o. female being evaluated by a Virtual Visit (video visit) encounter to address concerns as mentioned above. A caregiver was present when appropriate. Due to this being a TeleHealth encounter (During [facility]- public health emergency), evaluation of the following areas was limited: Direct tissue palpation, direct goniometric measurements, blood pressure, O2 saturation. Pursuant to the emergency declaration under the [address] [place] [address] authority and the [employer] Act, this Virtual Visit was conducted with patient's (and/or legal guardian's) consent, to reduce the risk of exposure to COVID-19 and provide necessary medical care. Services were provided through a video synchronous discussion virtually to substitute for in-person encounter. --Solomon Hutchison PT on 4/28/2020 at 11:35 AM    An electronic signature was used to authenticate this note.

## 2020-06-02 ENCOUNTER — TELEPHONE (OUTPATIENT)
Dept: FAMILY MEDICINE CLINIC | Age: 30
End: 2020-06-02

## 2020-06-02 NOTE — TELEPHONE ENCOUNTER
[phone]- left message for pt regarding her upcoming appointment on 6/9/20. Advised pt to please give the office a call back to discuss rescheduling this appointment due to COVID-19.       Nurse Note: reschedule for a later date

## 2020-07-01 NOTE — PATIENT INSTRUCTIONS
Acne: Care Instructions  Overview  Acne is a skin problem. It shows up as blackheads, whiteheads, and pimples. Acne most often affects the face, neck, and upper body. It occurs when oil and dead skin cells clog the skin's pores. Acne usually starts during the teen years and often lasts into adulthood. Gentle cleansing every day controls most mild acne. If home treatment doesn't work, your doctor may prescribe a cream, an antibiotic, or a stronger medicine called isotretinoin. Sometimes birth control pills help women who have monthly acne flare-ups. Follow-up care is a key part of your treatment and safety. Be sure to make and go to all appointments, and call your doctor if you are having problems. It's also a good idea to know your test results and keep a list of the medicines you take. How can you care for yourself at home? · Gently wash your face 1 or 2 times a day with warm (not hot) water and a mild soap or cleanser. Always rinse well. · Use an over-the-counter lotion or gel that contains benzoyl peroxide. Start with a small amount of 2.5% benzoyl peroxide and increase the strength as needed. Benzoyl peroxide works well for acne, but you may need to use it for up to 2 months before your acne starts to improve. · Apply acne cream, lotion, or gel to all the places you get pimples, blackheads, or whiteheads, not just where you have them now. Follow the instructions carefully. If your skin gets too dry and scaly or red and sore, reduce the amount. For the best results, apply medicines as directed. Try not to miss doses. · Do not squeeze or pick pimples and blackheads. This can cause infection and scarring. · Use only oil-free makeup, sunscreen, and other skin care products that will not clog your pores. · Wash your hair every day, and try to keep it off your face and shoulders. Consider pinning it back or cutting it short. When should you call for help?   Watch closely for changes in your health, and be sure to contact your doctor if:  · You have tried home treatment for 6 to 8 weeks and your acne is not better or gets worse. Your doctor may need to add to or change your treatment. · Your pimples become large and hard or filled with fluid. · Scars form after pimples heal.  · You feel sad or hopeless, lack energy, or have other signs of depression while you are taking the prescription medicine isotretinoin. · You start to have other symptoms, such as facial hair growth in women or bone and muscle pain. Where can you learn more? Go to http://tee-tanner.info/  Enter V108 in the search box to learn more about \"Acne: Care Instructions. \"  Current as of: October 31, 2019               Content Version: 12.5  © 9087-7037 Healthwise, Incorporated. Care instructions adapted under license by [employer] (which disclaims liability or warranty for this information). If you have questions about a medical condition or this instruction, always ask your healthcare professional. [place] [address] any warranty or liability for your use of this information.

## 2020-07-01 NOTE — PROGRESS NOTES
Edmond Olvera [facility]  [id number] [place] [address] [address]. [facility], [address]  [phone]             Date of visit: 7/2/20   Subjective:      History obtained from:  the patient. Fernando Workman is a 31 y.o. female who presents today for   Chief Complaint   Patient presents with    Complete Physical    Immunization/Injection     need to be updated for school. Cycle that keeps happening on her face; took doxycycline for 6 mo without results  Previously had tried lots of creams and washes like diffrin, benzoyl peroxide wash that irritated her skin too much  Still using the dapsone gel  Still on trisprintec no problems    Had advised seeing a dermatologist and for her to look into finding one that takes her insurance,   Would like to avoid acutane if possible  Doxycycline helped some but came off it due to being on it for 5 months    Sweating really bad in axillaes ever since puberty, had given drysol to try    Periods still ok on pills    Started PT school but mostly online    Eating well, getting exercise    Long history of scoliosis and we had ordered to repeat PT this past winter; really helped in the past  Did that some and it helped but stopped due to covid  Would like refill flexeril 10mg that she got in 2017, does help at the end of a day with back pain  Has chronic pain in left low back and right upper back  They tried dry  Needling and seemed to make it flare up manjeet on her neck    Due for pap   Due for TDAP     Patient Active Problem List    Diagnosis Date Noted    Other idiopathic scoliosis, thoracolumbar region 12/20/2019    Chronic left-sided low back pain without sciatica 12/20/2019    Upper back pain, chronic 12/20/2019    Hyperhidrosis of axilla 04/12/2019    Dysmenorrhea 04/12/2019    Cystic acne 04/12/2019     Current Outpatient Medications   Medication Sig Dispense Refill    spironolactone (ALDACTONE) 50 mg tablet Take 1 Tab by mouth daily.  90 Tab 1    cyclobenzaprine (FLEXERIL) 10 mg tablet Take 1 Tab by mouth three (3) times daily as needed for Muscle Spasm(s). 30 Tab 1    Tri-Sprintec, 28, 0.18/0.215/0.25 mg-35 mcg (28) tab TAKE 1 TABLET BY MOUTH EVERY DAY 28 Tab 12    aluminum chloride (DRYSOL) 20 % external solution Apply to affected area nightly 60 mL 6    doxycycline (ADOXA) 100 mg tablet TAKE 1 TABLET BY MOUTH TWICE A DAY 60 Tab 1     Allergies   Allergen Reactions    Minocycline Other (comments)     Tingling in hands and sun sensitivity     Past Medical History:   Diagnosis Date    Chicken pox 05/30/1998     History reviewed. No pertinent surgical history. 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      General: stated age, well-developed, and in NAD  Eyes: PERRL, EOMI, no redness or drainage  Nose: no drainage  Mouth: no lesions  Throat: no erythema, exudate or swelling  Neck: supple, symmetrical, trachea midline, no adenopathy and thyroid: not enlarged, symmetric, no tenderness/mass/nodules  Lungs:  clear to auscultation w/o rales, rhonchi, wheezes w/normal effort and no use of accessory muscles of respiration   Heart: regular rate and rhythm, S1, S2 normal, no murmur, click, rub or gallop  Abdomen: soft, nontender, no masses  Gyn: normal female externally, cervix and vagina without lesions or discharge, no cervical motion tenderness, no adnexal masses or tenderness   Ext:  No edema noted. Lymph: no cervical adenopathy appreciated  Skin:  One cystic acne lesion on forehead, few light scars on chin  Neuro: CN 2-12 intact, strength 5/5, patellar reflexes 2+ bilaterally, sensation intact to light touch bilaterally, cerebellar testing normal Fine intention tremor both hands and jaw, worse in right hand  Psych: alert and oriented to person, place, time and situation and Speech: appropriate quality, quantity and organization of sentences and normal affect    Assessment/Plan:       ICD-10-CM ICD-9-CM    1. Cystic acne L70.0 706.1    2. Hyperhidrosis of axilla L74.510 705.21 TSH 3RD GENERATION   3. Chronic left-sided low back pain without sciatica M54.5 724.2     G89.29 338.29    4. Other idiopathic scoliosis, thoracolumbar region M41.25 737.30    5. Upper back pain, chronic M54.9 724.5 CBC WITH AUTOMATED DIFF    O57.71 872.16 METABOLIC PANEL, COMPREHENSIVE   6. Dysmenorrhea N94.6 625.3    7. Cervical cancer screening Z12.4 V76.2 PAP IG, RFX APTIMA HPV ASCUS ([id number]))   8. Encounter for screening for HIV Z11.4 V73.89 HIV 1/2 AG/AB, 4TH GENERATION,W RFLX CONFIRM   9. Encounter for hepatitis C screening test for low risk patient Z11.59 V73.89 HEPATITIS C AB   10. Encounter for lipid screening for cardiovascular disease Z13.220 V77.91 LIPID PANEL    Z13.6 V81.2    11. Tuberculosis screening Z11.1 V74.1 QUANTIFERON-TB GOLD PLUS   12. Encounter for immunization Z23 V03.89 TETANUS, DIPHTHERIA TOXOIDS AND ACELLULAR PERTUSSIS VACCINE (TDAP), IN INDIVIDS. >=7, IM      PA IMMUNIZ ADMIN,1 SINGLE/COMB VAC/TOXOID   13. Encounter for school history and physical examination Z02.0 V70.5 VZV AB, IGG      HEP B SURFACE AB   14.  Familial tremor G25.0 333.1 T4, FREE        Orders Placed This Encounter    UT IMMUNIZ ADMIN,1 SINGLE/COMB VAC/TOXOID    QUANTIFERON-TB GOLD PLUS    TETANUS, DIPHTHERIA TOXOIDS AND ACELLULAR PERTUSSIS VACCINE (TDAP), IN INDIVIDS. >=7, IM    CBC WITH AUTOMATED DIFF    METABOLIC PANEL, COMPREHENSIVE    LIPID PANEL    TSH 3RD GENERATION    HEPATITIS C AB    HIV 1/2 AG/AB, 4TH GENERATION,W RFLX CONFIRM    VZV AB, IGG    HEP B SURFACE AB    T4, FREE    spironolactone (ALDACTONE) 50 mg tablet    cyclobenzaprine (FLEXERIL) 10 mg tablet    PAP IG, RFX APTIMA HPV ASCUS ([id number]))       Acne improved but still very bad on chin around her periods, seems to have a strong hormonal component  Still on OCPs but will add aldactone  Recheck bmp in 2 weeks    Back pain is improved with PT, doing home exercises for long-term maintenance    Filled out form for PT school  Updated shot record and gave tdap today  Checking titers as they require, also tb test  Other routine labs and pap done today    Discussed the diagnosis and plan and she expressed understanding.     Follow-up and Dispositions    · Return in about 1 year (around 7/2/2021) for Full Dejah Wagner MD

## 2020-07-02 ENCOUNTER — HOSPITAL ENCOUNTER (OUTPATIENT)
Dept: LAB | Age: 30
Discharge: HOME | End: 2020-07-02
Payer: COMMERCIAL

## 2020-07-02 ENCOUNTER — OFFICE VISIT (OUTPATIENT)
Dept: FAMILY MEDICINE CLINIC | Age: 30
End: 2020-07-02

## 2020-07-02 VITALS
DIASTOLIC BLOOD PRESSURE: 79 MMHG | OXYGEN SATURATION: 98 % | TEMPERATURE: 98.2 F | RESPIRATION RATE: 16 BRPM | SYSTOLIC BLOOD PRESSURE: 118 MMHG | HEART RATE: 82 BPM | BODY MASS INDEX: 20.89 KG/M2 | HEIGHT: 71 IN | WEIGHT: 149.2 LBS

## 2020-07-02 DIAGNOSIS — Z11.59 ENCOUNTER FOR HEPATITIS C SCREENING TEST FOR LOW RISK PATIENT: ICD-10-CM

## 2020-07-02 DIAGNOSIS — M54.9 UPPER BACK PAIN, CHRONIC: ICD-10-CM

## 2020-07-02 DIAGNOSIS — M54.50 CHRONIC LEFT-SIDED LOW BACK PAIN WITHOUT SCIATICA: ICD-10-CM

## 2020-07-02 DIAGNOSIS — G25.0 FAMILIAL TREMOR: ICD-10-CM

## 2020-07-02 DIAGNOSIS — G89.29 UPPER BACK PAIN, CHRONIC: ICD-10-CM

## 2020-07-02 DIAGNOSIS — Z11.4 ENCOUNTER FOR SCREENING FOR HIV: ICD-10-CM

## 2020-07-02 DIAGNOSIS — Z11.1 TUBERCULOSIS SCREENING: ICD-10-CM

## 2020-07-02 DIAGNOSIS — Z23 ENCOUNTER FOR IMMUNIZATION: ICD-10-CM

## 2020-07-02 DIAGNOSIS — Z13.6 ENCOUNTER FOR LIPID SCREENING FOR CARDIOVASCULAR DISEASE: ICD-10-CM

## 2020-07-02 DIAGNOSIS — M41.25 OTHER IDIOPATHIC SCOLIOSIS, THORACOLUMBAR REGION: ICD-10-CM

## 2020-07-02 DIAGNOSIS — L70.0 CYSTIC ACNE: Primary | ICD-10-CM

## 2020-07-02 DIAGNOSIS — N94.6 DYSMENORRHEA: ICD-10-CM

## 2020-07-02 DIAGNOSIS — L74.510 HYPERHIDROSIS OF AXILLA: ICD-10-CM

## 2020-07-02 DIAGNOSIS — G89.29 CHRONIC LEFT-SIDED LOW BACK PAIN WITHOUT SCIATICA: ICD-10-CM

## 2020-07-02 DIAGNOSIS — Z13.220 ENCOUNTER FOR LIPID SCREENING FOR CARDIOVASCULAR DISEASE: ICD-10-CM

## 2020-07-02 DIAGNOSIS — Z12.4 CERVICAL CANCER SCREENING: ICD-10-CM

## 2020-07-02 DIAGNOSIS — Z02.0 ENCOUNTER FOR SCHOOL HISTORY AND PHYSICAL EXAMINATION: ICD-10-CM

## 2020-07-02 PROCEDURE — 88175 CYTOPATH C/V AUTO FLUID REDO: CPT

## 2020-07-02 RX ORDER — SPIRONOLACTONE 50 MG/1
50 TABLET, FILM COATED ORAL DAILY
Qty: 90 TAB | Refills: 1 | Status: SHIPPED | OUTPATIENT
Start: 2020-07-02 | End: 2020-12-27

## 2020-07-02 RX ORDER — CYCLOBENZAPRINE HCL 10 MG
10 TABLET ORAL
Qty: 30 TAB | Refills: 1 | Status: SHIPPED | OUTPATIENT
Start: 2020-07-02 | End: 2021-03-26

## 2020-07-02 NOTE — PROGRESS NOTES
Chief Complaint   Patient presents with    Complete Physical    Immunization/Injection     need to be updated for school. 1. Have you been to the ER, urgent care clinic since your last visit? Hospitalized since your last visit? Yes, [facility] in [place] for COVID-19 test on 5/30. Test came back negative, family was positive. 2. Have you seen or consulted any other health care providers outside of the [facility] since your last visit? Include any pap smears or colon screening.  No

## 2020-07-07 LAB
ALBUMIN SERPL-MCNC: 5.1 G/DL (ref 3.9–5)
ALBUMIN/GLOB SERPL: 2 {RATIO} (ref 1.2–2.2)
ALP SERPL-CCNC: 45 IU/L (ref 39–117)
ALT SERPL-CCNC: 17 IU/L (ref 0–32)
AST SERPL-CCNC: 26 IU/L (ref 0–40)
BASOPHILS # BLD AUTO: 0 X10E3/UL (ref 0–0.2)
BASOPHILS NFR BLD AUTO: 0 %
BILIRUB SERPL-MCNC: 0.2 MG/DL (ref 0–1.2)
BUN SERPL-MCNC: 12 MG/DL (ref 6–20)
BUN/CREAT SERPL: 17 (ref 9–23)
CALCIUM SERPL-MCNC: 10 MG/DL (ref 8.7–10.2)
CHLORIDE SERPL-SCNC: 100 MMOL/L (ref 96–106)
CHOLEST SERPL-MCNC: 167 MG/DL (ref 100–199)
CO2 SERPL-SCNC: 25 MMOL/L (ref 20–29)
CREAT SERPL-MCNC: 0.72 MG/DL (ref 0.57–1)
EOSINOPHIL # BLD AUTO: 0.1 X10E3/UL (ref 0–0.4)
EOSINOPHIL NFR BLD AUTO: 1 %
ERYTHROCYTE [DISTWIDTH] IN BLOOD BY AUTOMATED COUNT: 12.6 % (ref 11.7–15.4)
GAMMA INTERFERON BACKGROUND BLD IA-ACNC: 0.08 IU/ML
GLOBULIN SER CALC-MCNC: 2.6 G/DL (ref 1.5–4.5)
GLUCOSE SERPL-MCNC: 89 MG/DL (ref 65–99)
HBV SURFACE AB SER QL: NON REACTIVE
HCT VFR BLD AUTO: 42.5 % (ref 34–46.6)
HCV AB S/CO SERPL IA: <0.1 S/CO RATIO (ref 0–0.9)
HDLC SERPL-MCNC: 77 MG/DL
HGB BLD-MCNC: 14.4 G/DL (ref 11.1–15.9)
HIV 1+2 AB+HIV1 P24 AG SERPL QL IA: NON REACTIVE
IMM GRANULOCYTES # BLD AUTO: 0 X10E3/UL (ref 0–0.1)
IMM GRANULOCYTES NFR BLD AUTO: 0 %
INTERPRETATION, 910389: NORMAL
LDLC SERPL CALC-MCNC: 68 MG/DL (ref 0–99)
LYMPHOCYTES # BLD AUTO: 2.4 X10E3/UL (ref 0.7–3.1)
LYMPHOCYTES NFR BLD AUTO: 26 %
M TB IFN-G BLD-IMP: NEGATIVE
M TB IFN-G CD4+ BCKGRND COR BLD-ACNC: 0.08 IU/ML
MCH RBC QN AUTO: 32.7 PG (ref 26.6–33)
MCHC RBC AUTO-ENTMCNC: 33.9 G/DL (ref 31.5–35.7)
MCV RBC AUTO: 96 FL (ref 79–97)
MITOGEN IGNF BLD-ACNC: >10 IU/ML
MONOCYTES # BLD AUTO: 0.6 X10E3/UL (ref 0.1–0.9)
MONOCYTES NFR BLD AUTO: 6 %
NEUTROPHILS # BLD AUTO: 6.2 X10E3/UL (ref 1.4–7)
NEUTROPHILS NFR BLD AUTO: 67 %
PLATELET # BLD AUTO: 284 X10E3/UL (ref 150–450)
POTASSIUM SERPL-SCNC: 4.3 MMOL/L (ref 3.5–5.2)
PROT SERPL-MCNC: 7.7 G/DL (ref 6–8.5)
QUANTIFERON INCUBATION, QF1T: NORMAL
QUANTIFERON TB2 AG: 0.08 IU/ML
RBC # BLD AUTO: 4.41 X10E6/UL (ref 3.77–5.28)
SERVICE CMNT-IMP: NORMAL
SODIUM SERPL-SCNC: 140 MMOL/L (ref 134–144)
T4 FREE SERPL-MCNC: 1.25 NG/DL (ref 0.82–1.77)
TRIGL SERPL-MCNC: 112 MG/DL (ref 0–149)
TSH SERPL DL<=0.005 MIU/L-ACNC: 1.4 UIU/ML (ref 0.45–4.5)
VLDLC SERPL CALC-MCNC: 22 MG/DL (ref 5–40)
VZV IGG SER IA-ACNC: 1748 INDEX
WBC # BLD AUTO: 9.3 X10E3/UL (ref 3.4–10.8)

## 2020-07-08 RX ORDER — HEPATITIS B VACCINE (RECOMBINANT) 20 UG/ML
20 INJECTION, SUSPENSION INTRAMUSCULAR ONCE
Qty: 1 ML | Refills: 0 | Status: SHIPPED | OUTPATIENT
Start: 2020-07-08 | End: 2020-07-09 | Stop reason: SDUPTHER

## 2020-07-08 NOTE — TELEPHONE ENCOUNTER
MD Peace,    The new Hepatitis B vaccine sent today 20 was for brand that the pharmacy does not stock. Requesting Engerix. I contacted them and attached the one they have below. Thanks, Manuela      Requested Prescriptions     Pending Prescriptions Disp Refills    hepatitis B virus vacc.rec,PF, (Engerix-B, PF,) 20 mcg/mL susp injection 1 mL 0     Si mL by IntraMUSCular route once for 1 dose.

## 2020-07-09 RX ORDER — HEPATITIS B VACCINE (RECOMBINANT) 20 UG/ML
20 INJECTION, SUSPENSION INTRAMUSCULAR ONCE
Qty: 1 ML | Refills: 0 | Status: SHIPPED | OUTPATIENT
Start: 2020-08-08 | End: 2020-07-09 | Stop reason: SDUPTHER

## 2020-07-09 RX ORDER — HEPATITIS B VACCINE (RECOMBINANT) 20 UG/ML
20 INJECTION, SUSPENSION INTRAMUSCULAR ONCE
Qty: 1 ML | Refills: 0 | Status: SHIPPED | OUTPATIENT
Start: 2021-01-09 | End: 2021-01-09

## 2020-08-04 ENCOUNTER — PATIENT MESSAGE (OUTPATIENT)
Dept: FAMILY MEDICINE CLINIC | Age: 30
End: 2020-08-04

## 2020-08-04 DIAGNOSIS — Z51.81 MEDICATION MONITORING ENCOUNTER: Primary | ICD-10-CM

## 2020-08-04 DIAGNOSIS — L70.0 CYSTIC ACNE: ICD-10-CM

## 2020-08-04 NOTE — TELEPHONE ENCOUNTER
From: David Boo  To: Sole Tavarez MD  Sent: 8/4/2020 3:02 PM EDT  Subject: Prescription Question    Hi Dr. Patel Backers! You mentioned with the new medicine you put me on (Spironolacton) that I should get my potassium levels checked about two weeks into taking it. I have availability to go to a lab on Thursday August 5th if you could send over the order for that? Also, do you need to send in a prescription for the second round of the Hep B vaccine? I will be going to [facility] on Monday August 10th to get that. I wasn't sure if you had to send in an order for the second round or they would have that ready. The lab that is closest to me for the potassium check is the [facility] at [address], [address] [place] [address].     Thanks, Ksenia Soni

## 2020-08-07 LAB
BUN SERPL-MCNC: 14 MG/DL (ref 6–20)
BUN/CREAT SERPL: 18 (ref 9–23)
CALCIUM SERPL-MCNC: 9.7 MG/DL (ref 8.7–10.2)
CHLORIDE SERPL-SCNC: 100 MMOL/L (ref 96–106)
CO2 SERPL-SCNC: 24 MMOL/L (ref 20–29)
CREAT SERPL-MCNC: 0.8 MG/DL (ref 0.57–1)
GLUCOSE SERPL-MCNC: 82 MG/DL (ref 65–99)
POTASSIUM SERPL-SCNC: 4.4 MMOL/L (ref 3.5–5.2)
SODIUM SERPL-SCNC: 138 MMOL/L (ref 134–144)

## 2020-09-16 NOTE — PROGRESS NOTES
[address] [facility], [address] [place] [address] Drive  Phone: [phone]  Fax: [phone]    Discharge Summary  2-15    Patient name: Marcia Jarquin  : 1990  Provider#: [id number]  Referral source: Lexy Kimball MD      Medical/Treatment Diagnosis: Low back pain [M54.5]  Thoracic back pain [M54.6]     Prior Hospitalization: see medical history     Comorbidities: See Plan of Care  Prior Level of Function:See Plan of Care  Medications: Verified on Patient Summary List    Start of Care: 1/15/2020      Onset Date:15 years ago   Visits from Start of Care: 6     Missed Visits: 0  Reporting Period : 1/15/2020 to 2020      ASSESSMENT/SUMMARY OF CARE: The patient did very well with therapy with a focus on a ZENAIDA neuromuscular re-education program with dry needling and spinal manipulation for pain control. She achieved all long term goals and is independent in her HEP. Short Term Goals: To be accomplished in 12 treatments:  1. Patient will be able to demonstrate negative HAddDT and PADT bilaterally to allow for the achievement pelvic neutrality during gait. Met.  2. Patient will be able to demonstrate WNL Standing Reach test to demonstrate adequate posterior outlet inhibition and posterior mediastinum expansion. Met.  3. Patient will be able to jog for 50 ft. With no pain or limitation. Long Term Goals: To be accomplished in 24 treatments:  1. Patient will be able to demonstrate 5/5 Functional Squat test to allow for optimal AF IR to be achieved during gait. Met.  2. Patient will be able to demonstrate 5/5 HAddLT B to allow for optimal integration of frontal and transverse plane pelvic musculature during gait. Met.  3. Patient will be able to run for 1 mile without pain or limitations. Met.        RECOMMENDATIONS:  [x]Discontinue therapy: [x]Patient has reached or is progressing toward set goals      []Patient is non-compliant or has abdicated      []Due to lack of appreciable progress towards set goals      []Other    Lyubov Huertas, PT 9/16/2020

## 2020-12-27 RX ORDER — SPIRONOLACTONE 50 MG/1
TABLET, FILM COATED ORAL
Qty: 90 TAB | Refills: 1 | Status: SHIPPED | OUTPATIENT
Start: 2020-12-27 | End: 2021-03-26 | Stop reason: SDUPTHER

## 2021-02-03 ENCOUNTER — TELEPHONE (OUTPATIENT)
Dept: FAMILY MEDICINE CLINIC | Age: 31
End: 2021-02-03

## 2021-02-03 NOTE — TELEPHONE ENCOUNTER
Called, spoke to pt. Two pt identifiers confirmed. Patient informed [facility] and she will address establishing care with MD later.

## 2021-02-03 NOTE — TELEPHONE ENCOUNTER
Patient was informed by Dr. Alcides Doherty that she would put in orders for the patient to have hep-B titre. There are no lab orders in . Please advise patient.       BCB#[phone]

## 2021-03-26 ENCOUNTER — VIRTUAL VISIT (OUTPATIENT)
Dept: FAMILY MEDICINE CLINIC | Age: 31
End: 2021-03-26
Payer: COMMERCIAL

## 2021-03-26 DIAGNOSIS — L70.0 CYSTIC ACNE: Primary | ICD-10-CM

## 2021-03-26 DIAGNOSIS — Z76.89 ENCOUNTER TO ESTABLISH CARE WITH NEW DOCTOR: ICD-10-CM

## 2021-03-26 PROBLEM — M54.9 UPPER BACK PAIN, CHRONIC: Status: RESOLVED | Noted: 2019-12-20 | Resolved: 2021-03-26

## 2021-03-26 PROBLEM — M54.50 CHRONIC LEFT-SIDED LOW BACK PAIN WITHOUT SCIATICA: Status: RESOLVED | Noted: 2019-12-20 | Resolved: 2021-03-26

## 2021-03-26 PROBLEM — G89.29 CHRONIC LEFT-SIDED LOW BACK PAIN WITHOUT SCIATICA: Status: RESOLVED | Noted: 2019-12-20 | Resolved: 2021-03-26

## 2021-03-26 PROBLEM — G89.29 UPPER BACK PAIN, CHRONIC: Status: RESOLVED | Noted: 2019-12-20 | Resolved: 2021-03-26

## 2021-03-26 PROCEDURE — 99213 OFFICE O/P EST LOW 20 MIN: CPT | Performed by: FAMILY MEDICINE

## 2021-03-26 RX ORDER — NORGESTIMATE AND ETHINYL ESTRADIOL 7DAYSX3 28
KIT ORAL
Qty: 3 PACKAGE | Refills: 3 | Status: SHIPPED | OUTPATIENT
Start: 2021-03-26 | End: 2022-03-24 | Stop reason: SDUPTHER

## 2021-03-26 RX ORDER — SPIRONOLACTONE 50 MG/1
TABLET, FILM COATED ORAL
Qty: 90 TAB | Refills: 1 | Status: SHIPPED | OUTPATIENT
Start: 2021-03-26 | End: 2022-01-03 | Stop reason: SDUPTHER

## 2021-03-26 NOTE — PROGRESS NOTES
Zaida Birch, who was evaluated through a synchronous (real-time) audio-video encounter, and/or her healthcare decision maker, is aware that it is a billable service, with coverage as determined by her insurance carrier. She provided verbal consent to proceed: YES, and patient identification was verified. It was conducted pursuant to the emergency declaration under the [facility], [facility] authority and the [employer] and [employer] Act. A caregiver was present when appropriate. Ability to conduct physical exam was limited. I was at home. The patient was at home. This virtual visit was conducted via [address] Pursuant to the emergency declaration under the [facility] [place]note 2[place], [facility] authority and the [employer] Act, this Virtual  Visit was conducted to reduce the patient's risk of exposure to COVID-19 and provide continuity of care for an established patient. Services were provided through a video synchronous discussion virtually to substitute for in-person clinic visit. Due to this being a TeleHealth evaluation, many elements of the physical examination are unable to be assessed. Total Time: minutes: 5-10 minutes. Anival Dalton MD    712  Subjective:   Zaida Birch was seen for: Transferring care from prior PCP Dr. Betzaida Barraza. Taking OCP and spirolactone for acne, doing well. Needs refills. Patient denies tobacco use or history of CAD, VTE, CVA, migraine with aura, breast cancer, or liver problems. Prior to Admission medications    Medication Sig Start Date End Date Taking? Authorizing Provider   spironolactone (ALDACTONE) 50 mg tablet TAKE 1 TABLET BY MOUTH EVERY DAY 12/27/20   Sheila Peace MD   cyclobenzaprine (FLEXERIL) 10 mg tablet Take 1 Tab by mouth three (3) times daily as needed for Muscle Spasm(s).  7/2/20   Nata Shiraz Ellis MD   Tri-Sprintec, 28, 0.18/0.215/0.25 mg-35 mcg (28) tab TAKE 1 TABLET BY MOUTH EVERY DAY 4/22/20   Shiraz Peace MD   aluminum chloride (DRYSOL) 20 % external solution Apply to affected area nightly 12/20/19   Shiraz Peace MD       Allergies   Allergen Reactions    Minocycline Other (comments)     Tingling in hands and sun sensitivity       Review of Systems   Constitutional: Negative for fever, malaise/fatigue and weight loss. Respiratory: Negative for cough, hemoptysis, shortness of breath and wheezing. Cardiovascular: Negative for chest pain, palpitations, leg swelling and PND. Gastrointestinal: Negative for abdominal pain, constipation, diarrhea, nausea and vomiting. 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 Family History   Problem Relation Age of Onset    Hypertension Mother     Hypertension Father        Social History     Socioeconomic History    Marital status: SINGLE     Spouse name: Not on file    Number of children: Not on file    Years of education: Not on file    Highest education level: Not on file   Occupational History    Not on file   Social Needs    Financial resource strain: Not on file    Food insecurity     Worry: Not on file     Inability: Not on file    Transportation needs     Medical: Not on file     Non-medical: Not on file   Tobacco Use    Smoking status: Never Smoker    Smokeless tobacco: Never Used   Substance and Sexual Activity    Alcohol use:  Yes     Alcohol/week: 2.0 standard drinks     Types: 2 Cans of beer per week     Comment: weekly     Drug use: Never    Sexual activity: Not Currently     Partners: Male     Birth control/protection: Pill   Lifestyle    Physical activity     Days per week: Not on file Minutes per session: Not on file    Stress: Not on file   Relationships    Social connections     Talks on phone: Not on file     Gets together: Not on file     Attends [employer] service: Not on file     Active member of club or organization: Not on file     Attends meetings of clubs or organizations: Not on file     Relationship status: Not on file    Intimate partner violence     Fear of current or ex partner: Not on file     Emotionally abused: Not on file     Physically abused: Not on file     Forced sexual activity: Not on file   Other Topics Concern    Not on file   Social History Narrative    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 General: alert, cooperative, no distress   Mental  status: normal mood, behavior, speech, dress, motor activity, and thought processes, able to follow commands   HENT: NCAT   Neck: no visualized mass   Resp: no respiratory distress   Neuro: no gross deficits   Skin: no discoloration or lesions of concern on visible areas   Psychiatric: normal affect, consistent with stated mood, no evidence of hallucinations       Assessment & Plan:     Allegra Castrejon is a 31 y.o. female who presents today for:    1. Encounter to establish care with new doctor    2. Cystic acne  Stable, continue current treatment. - norgestimate-ethinyl estradioL (Tri-Sprintec, 28,) 0.18/0.215/0.25 mg-35 mcg (28) tab; TAKE 1 TABLET BY MOUTH EVERY DAY  Dispense: 3 Package; Refill: 3  - spironolactone (ALDACTONE) 50 mg tablet; TAKE 1 TABLET BY MOUTH EVERY DAY  Dispense: 90 Tab;  Refill: 1       Medications Discontinued During This Encounter   Medication Reason    aluminum chloride (DRYSOL) 20 % external solution LIST CLEANUP    cyclobenzaprine (FLEXERIL) 10 mg tablet LIST CLEANUP    Tri-Sprintec, 28, 0.18/0.215/0.25 mg-35 mcg (28) tab REORDER    spironolactone (ALDACTONE) 50 mg tablet REORDER     Follow-up and Dispositions    · Return in about 4 months (around 7/26/2021) for CPE (30 min). Treatment risks/benefits/costs/interactions/alternatives discussed with patient. Advised patient to call back or return to office if symptoms worsen/change/persist. If patient cannot reach us or should anything more severe/urgent arise he/she should proceed directly to the nearest emergency department. Discussed expected course/resolution/complications of diagnosis in detail with patient. Patient expressed understanding with the diagnosis and plan. Nicole Cagle M.D.

## 2021-04-24 ENCOUNTER — PATIENT MESSAGE (OUTPATIENT)
Dept: FAMILY MEDICINE CLINIC | Age: 31
End: 2021-04-24

## 2021-08-02 ENCOUNTER — OFFICE VISIT (OUTPATIENT)
Dept: FAMILY MEDICINE CLINIC | Age: 31
End: 2021-08-02
Payer: COMMERCIAL

## 2021-08-02 VITALS
DIASTOLIC BLOOD PRESSURE: 60 MMHG | HEIGHT: 71 IN | WEIGHT: 146.6 LBS | RESPIRATION RATE: 18 BRPM | HEART RATE: 85 BPM | SYSTOLIC BLOOD PRESSURE: 98 MMHG | BODY MASS INDEX: 20.52 KG/M2 | TEMPERATURE: 98 F | OXYGEN SATURATION: 97 %

## 2021-08-02 DIAGNOSIS — Z11.1 SCREENING-PULMONARY TB: ICD-10-CM

## 2021-08-02 DIAGNOSIS — Z13.1 SCREENING FOR DIABETES MELLITUS: ICD-10-CM

## 2021-08-02 DIAGNOSIS — Z13.220 SCREENING FOR LIPID DISORDERS: ICD-10-CM

## 2021-08-02 DIAGNOSIS — Z00.00 ROUTINE GENERAL MEDICAL EXAMINATION AT HEALTH CARE FACILITY: Primary | ICD-10-CM

## 2021-08-02 DIAGNOSIS — L74.510 HYPERHIDROSIS OF AXILLA: ICD-10-CM

## 2021-08-02 PROCEDURE — 99395 PREV VISIT EST AGE 18-39: CPT | Performed by: FAMILY MEDICINE

## 2021-08-02 RX ORDER — ALUMINUM CHLORIDE 20 %
SOLUTION, NON-ORAL TOPICAL
Qty: 60 ML | Refills: 2 | Status: SHIPPED | OUTPATIENT
Start: 2021-08-02

## 2021-08-02 NOTE — PROGRESS NOTES
Patient Name: Richard Frausto   MRN: [id number]    Taina Boy is a 36 y.o. female who presents with the following:     Cervical Cancer Screening: up to date. 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 Requesting refill of Drysol. Review of Systems   Constitutional: Negative for fever, malaise/fatigue and weight loss. Respiratory: Negative for cough, hemoptysis, shortness of breath and wheezing. Cardiovascular: Negative for chest pain, palpitations, leg swelling and PND. Gastrointestinal: Negative for abdominal pain, constipation, diarrhea, nausea and vomiting. The patient's medications, allergies, past medical history, surgical history, family history and social history were reviewed and updated where appropriate. Current Outpatient Medications:     norgestimate-ethinyl estradioL (Tri-Sprintec, 28,) 0.18/0.215/0.25 mg-35 mcg (28) tab, TAKE 1 TABLET BY MOUTH EVERY DAY, Disp: 3 Package, Rfl: 3    spironolactone (ALDACTONE) 50 mg tablet, TAKE 1 TABLET BY MOUTH EVERY DAY, Disp: 90 Tab, Rfl: 1    Allergies   Allergen Reactions    Minocycline Other (comments)     Tingling in hands and sun sensitivity         OBJECTIVE    Visit Vitals  BP 98/60 (BP 1 Location: Right upper arm, BP Patient Position: Sitting, BP Cuff Size: Adult)   Pulse 85   Temp 98 °F (36.7 °C) (Temporal)   Resp 18   Ht 5' 11\" (1.803 m)   Wt 146 lb 9.6 oz (66.5 kg)   LMP 07/13/2021 (Approximate)   SpO2 97%   BMI 20.45 kg/m²       Physical Exam  Constitutional:       General: She is not in acute distress.      Appearance: She is not diaphoretic. HENT:      Head: Normocephalic. Right Ear: External ear normal.      Left Ear: External ear normal.   Eyes:      Conjunctiva/sclera: Conjunctivae normal.      Pupils: Pupils are equal, round, and reactive to light. Cardiovascular:      Rate and Rhythm: Normal rate and regular rhythm. Heart sounds: Normal heart sounds. No murmur heard. No friction rub. No gallop. Pulmonary:      Effort: Pulmonary effort is normal. No respiratory distress. Breath sounds: Normal breath sounds. No wheezing or rales. Abdominal:      General: Bowel sounds are normal. There is no distension. Palpations: Abdomen is soft. Tenderness: There is no abdominal tenderness. There is no guarding or rebound. Skin:     General: Skin is warm and dry. Neurological:      Mental Status: She is alert and oriented to person, place, and time. Psychiatric:         Mood and Affect: Affect normal.         Cognition and Memory: Memory normal.         Judgment: Judgment normal.           ASSESSMENT AND PLAN  Karyle Flood is a 36 y.o. female who presents today for:    1. Routine general medical examination at health care facility  Reviewed age appropriate screening tests as recommended by the USPSTF Preventive Services Database with patient today. 2. Screening for diabetes mellitus  - HEMOGLOBIN A1C WITH EAG; Future    3. Screening for lipid disorders  - CBC W/O DIFF; Future  - METABOLIC PANEL, COMPREHENSIVE; Future  - LIPID PANEL; Future    4. Screening-pulmonary TB  - QUANTIFERON-TB GOLD PLUS; Future    5. Hyperhidrosis of axilla  - aluminum chloride (Drysol) 20 % external solution; Apply to affected area nightly  Dispense: 60 mL; Refill: 2    There are no discontinued medications. Follow-up and Dispositions    · Return in about 1 year (around 8/2/2022) for CPE (30 min). Treatment risks/benefits/costs/interactions/alternatives discussed with patient.   Advised patient to call back or return to office if symptoms worsen/change/persist. If patient cannot reach us or should anything more severe/urgent arise he/she should proceed directly to the nearest emergency department. Discussed expected course/resolution/complications of diagnosis in detail with patient. Patient expressed understanding with the diagnosis and plan. Nicole Griffiths M.D.

## 2021-08-02 NOTE — PROGRESS NOTES
Identified pt with two pt identifiers(name and ). Reviewed record in preparation for visit and have obtained necessary documentation. Chief Complaint   Patient presents with    Complete Physical        Vitals:    21 1052   Weight: 146 lb 9.6 oz (66.5 kg)   Height: 5' 11\" (1.803 m)   PainSc:   0 - No pain   LMP: 2021       There are no preventive care reminders to display for this patient. Coordination of Care Questionnaire:  :   1) Have you been to an emergency room, urgent care, or hospitalized since your last visit? If yes, where when, and reason for visit? no       2. Have seen or consulted any other health care provider since your last visit? If yes, where when, and reason for visit? NO      Patient is accompanied by self I have received verbal consent from Anitra Vivar to discuss any/all medical information while they are present in the room.

## 2021-08-03 LAB
ALBUMIN SERPL-MCNC: 4.3 G/DL (ref 3.5–5)
ALBUMIN/GLOB SERPL: 1.2 {RATIO} (ref 1.1–2.2)
ALP SERPL-CCNC: 46 U/L (ref 45–117)
ALT SERPL-CCNC: 26 U/L (ref 12–78)
ANION GAP SERPL CALC-SCNC: 7 MMOL/L (ref 5–15)
AST SERPL-CCNC: 26 U/L (ref 15–37)
BILIRUB SERPL-MCNC: 0.3 MG/DL (ref 0.2–1)
BUN SERPL-MCNC: 12 MG/DL (ref 6–20)
BUN/CREAT SERPL: 15 (ref 12–20)
CALCIUM SERPL-MCNC: 9.9 MG/DL (ref 8.5–10.1)
CHLORIDE SERPL-SCNC: 103 MMOL/L (ref 97–108)
CHOLEST SERPL-MCNC: 170 MG/DL
CO2 SERPL-SCNC: 27 MMOL/L (ref 21–32)
CREAT SERPL-MCNC: 0.8 MG/DL (ref 0.55–1.02)
ERYTHROCYTE [DISTWIDTH] IN BLOOD BY AUTOMATED COUNT: 11.8 % (ref 11.5–14.5)
EST. AVERAGE GLUCOSE BLD GHB EST-MCNC: 91 MG/DL
GLOBULIN SER CALC-MCNC: 3.7 G/DL (ref 2–4)
GLUCOSE SERPL-MCNC: 65 MG/DL (ref 65–100)
HBA1C MFR BLD: 4.8 % (ref 4–5.6)
HCT VFR BLD AUTO: 45.9 % (ref 35–47)
HDLC SERPL-MCNC: 79 MG/DL
HDLC SERPL: 2.2 {RATIO} (ref 0–5)
HGB BLD-MCNC: 15.3 G/DL (ref 11.5–16)
LDLC SERPL CALC-MCNC: 68.8 MG/DL (ref 0–100)
MCH RBC QN AUTO: 33 PG (ref 26–34)
MCHC RBC AUTO-ENTMCNC: 33.3 G/DL (ref 30–36.5)
MCV RBC AUTO: 99.1 FL (ref 80–99)
NRBC # BLD: 0 K/UL (ref 0–0.01)
NRBC BLD-RTO: 0 PER 100 WBC
PLATELET # BLD AUTO: 294 K/UL (ref 150–400)
PMV BLD AUTO: 10.4 FL (ref 8.9–12.9)
POTASSIUM SERPL-SCNC: 4.1 MMOL/L (ref 3.5–5.1)
PROT SERPL-MCNC: 8 G/DL (ref 6.4–8.2)
RBC # BLD AUTO: 4.63 M/UL (ref 3.8–5.2)
SODIUM SERPL-SCNC: 137 MMOL/L (ref 136–145)
TRIGL SERPL-MCNC: 111 MG/DL (ref ?–150)
VLDLC SERPL CALC-MCNC: 22.2 MG/DL
WBC # BLD AUTO: 7 K/UL (ref 3.6–11)

## 2021-08-11 LAB — QUANTIFERON INCUBATION, QF1T: NORMAL

## 2021-08-12 NOTE — PROGRESS NOTES
Dear Ms. Garnett,    I wanted to follow up on your recent test results: All labs including the tuberculosis test are normal/negative. For some reason the lab had to process the TB test result manually so it might not be available through [employer] for you to review. Would you like us to mail you a paper copy of it?

## 2022-01-03 DIAGNOSIS — L70.0 CYSTIC ACNE: ICD-10-CM

## 2022-01-05 RX ORDER — SPIRONOLACTONE 50 MG/1
TABLET, FILM COATED ORAL
Qty: 90 TABLET | Refills: 1 | Status: SHIPPED | OUTPATIENT
Start: 2022-01-05 | End: 2022-06-20

## 2022-03-19 PROBLEM — N94.6 DYSMENORRHEA: Status: ACTIVE | Noted: 2019-04-12

## 2022-03-19 PROBLEM — M41.25 OTHER IDIOPATHIC SCOLIOSIS, THORACOLUMBAR REGION: Status: ACTIVE | Noted: 2019-12-20

## 2022-03-20 PROBLEM — L74.510 HYPERHIDROSIS OF AXILLA: Status: ACTIVE | Noted: 2019-04-12

## 2022-03-20 PROBLEM — L70.0 CYSTIC ACNE: Status: ACTIVE | Noted: 2019-04-12

## 2022-07-01 ENCOUNTER — TELEPHONE (OUTPATIENT)
Dept: FAMILY MEDICINE CLINIC | Age: 32
End: 2022-07-01

## 2022-07-01 NOTE — TELEPHONE ENCOUNTER
Left VM for patient to call office back to schedule a physical appointment with Dr. Melvin Ask last physical was 08/02/21) pt wants appointment before 09/16/22, please inform that Everardo Second next available isn't until 09/20/22 and she can only have it done by her PCP no other provider.  07/01/22TM

## 2022-07-01 NOTE — TELEPHONE ENCOUNTER
----- Message from Gavin Stephens sent at 7/1/2022 12:38 PM EDT -----  Subject: Referral Request    Reason for referral request? labs  Provider patient wants to be referred to(if known):     Provider Phone Number(if known):     Additional Information for Provider? patient has upcoming cpe on 7/21/22   and requesting labs   ---------------------------------------------------------------------------  --------------  [address]    [phone]; OK to leave message on voicemail  ---------------------------------------------------------------------------  --------------

## 2022-07-01 NOTE — TELEPHONE ENCOUNTER
----- Message from [facility] sent at 7/1/2022 12:07 PM EDT -----  Subject: Appointment Request    Reason for Call: Established Patient Appointment needed: Routine Physical   Exam    QUESTIONS    Reason for appointment request? Available appointments did not meet   patient need     Additional Information for Provider? Pt would like an physical done asapSammy Hernandez would like an appt before 9/16   ---------------------------------------------------------------------------  --------------  Nivia Christianson INFO  [phone]; OK to leave message on voicemail  ---------------------------------------------------------------------------  --------------  SCRIPT ANSWERS  COVID Screen: Mackenzie Abernathy

## 2022-07-01 NOTE — TELEPHONE ENCOUNTER
Called patient and confirmed two identifiers. Call center scheduled patient in 15 minute slot for CPE. Rescheduled patient for 30 min appointment. Appt notes from 15 min visit for documentation:     Appt Reason: Routine (Patient Request) No Script  screened green cpe, with labs   Booking Code: Mary Bautista by: Ursula Ellington, 07/01/2022 at 12:35 PM    Patient stated she told the  that she wanted to schedule an appointment for a physical and get labs done.

## 2022-07-08 DIAGNOSIS — L70.0 CYSTIC ACNE: ICD-10-CM

## 2022-07-11 DIAGNOSIS — L70.0 CYSTIC ACNE: ICD-10-CM

## 2022-07-11 RX ORDER — SPIRONOLACTONE 50 MG/1
50 TABLET, FILM COATED ORAL DAILY
Qty: 90 TABLET | Refills: 0 | OUTPATIENT
Start: 2022-07-11

## 2022-07-11 NOTE — TELEPHONE ENCOUNTER
----- Message from Salvador Schultz sent at 7/11/2022  1:41 PM EDT -----  Subject: Appointment Request    Reason for Call: Established Patient Appointment needed: Routine Physical   Exam    QUESTIONS    Reason for appointment request? Available appointments did not meet   patient need     Additional Information for Provider? pt wants to reschedule physical on   7/22 to sometime in august. All that is available for her  is in   [address] [place] [address]. She isn't available in September.   ---------------------------------------------------------------------------  --------------  Skip Camara [facility]  [phone]; OK to leave message on voicemail  ---------------------------------------------------------------------------  --------------  SCRIPT ANSWERS  COVID Screen: Velasquez Arceo

## 2022-07-11 NOTE — TELEPHONE ENCOUNTER
Pt is currently scheduled to come in on 7/22/22 @ 9:00 am however it is too soon. 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 Pt was wanting to know if there is anyway the nurse could get her in sooner for her CPE and Labs as well as TB before 9/16/22. I informed the pt that I doubted it but that I would send back a message to 's nurse.

## 2022-07-11 NOTE — TELEPHONE ENCOUNTER
Duplicate      For Pharmacy Admin Tracking Only     CPA in place:    Recommendation Provided To:    Intervention Detail: Discontinued Rx: 1, reason: Duplicate Therapy   Gap Closed?:    Intervention Accepted By:   Annika Davis Time Spent (min): 5

## 2022-07-11 NOTE — TELEPHONE ENCOUNTER
Aldactone was sent on 6/20/22 for #90      For [facility] in place:    Recommendation Provided To:    Intervention Detail: Discontinued Rx: 1, reason: Duplicate Therapy   Gap Closed?:    Intervention Accepted By:   Domo Oneal Time Spent (min): 5

## 2022-08-09 ENCOUNTER — OFFICE VISIT (OUTPATIENT)
Dept: FAMILY MEDICINE CLINIC | Age: 32
End: 2022-08-09
Payer: COMMERCIAL

## 2022-08-09 VITALS
BODY MASS INDEX: 20.41 KG/M2 | HEIGHT: 71 IN | OXYGEN SATURATION: 98 % | RESPIRATION RATE: 16 BRPM | SYSTOLIC BLOOD PRESSURE: 112 MMHG | WEIGHT: 145.8 LBS | HEART RATE: 73 BPM | DIASTOLIC BLOOD PRESSURE: 64 MMHG | TEMPERATURE: 98.7 F

## 2022-08-09 DIAGNOSIS — Z00.00 ROUTINE GENERAL MEDICAL EXAMINATION AT HEALTH CARE FACILITY: Primary | ICD-10-CM

## 2022-08-09 DIAGNOSIS — M41.25 OTHER IDIOPATHIC SCOLIOSIS, THORACOLUMBAR REGION: ICD-10-CM

## 2022-08-09 DIAGNOSIS — Z13.220 SCREENING FOR LIPID DISORDERS: ICD-10-CM

## 2022-08-09 DIAGNOSIS — Z13.1 SCREENING FOR DIABETES MELLITUS: ICD-10-CM

## 2022-08-09 DIAGNOSIS — Z11.1 SCREENING-PULMONARY TB: ICD-10-CM

## 2022-08-09 LAB
ALBUMIN SERPL-MCNC: 3.9 G/DL (ref 3.5–5)
ALBUMIN/GLOB SERPL: 1.1 {RATIO} (ref 1.1–2.2)
ALP SERPL-CCNC: 41 U/L (ref 45–117)
ALT SERPL-CCNC: 23 U/L (ref 12–78)
ANION GAP SERPL CALC-SCNC: 6 MMOL/L (ref 5–15)
AST SERPL-CCNC: 30 U/L (ref 15–37)
BILIRUB SERPL-MCNC: 0.3 MG/DL (ref 0.2–1)
BUN SERPL-MCNC: 14 MG/DL (ref 6–20)
BUN/CREAT SERPL: 18 (ref 12–20)
CALCIUM SERPL-MCNC: 9.4 MG/DL (ref 8.5–10.1)
CHLORIDE SERPL-SCNC: 104 MMOL/L (ref 97–108)
CHOLEST SERPL-MCNC: 147 MG/DL
CO2 SERPL-SCNC: 27 MMOL/L (ref 21–32)
CREAT SERPL-MCNC: 0.79 MG/DL (ref 0.55–1.02)
ERYTHROCYTE [DISTWIDTH] IN BLOOD BY AUTOMATED COUNT: 11.9 % (ref 11.5–14.5)
EST. AVERAGE GLUCOSE BLD GHB EST-MCNC: 94 MG/DL
GLOBULIN SER CALC-MCNC: 3.5 G/DL (ref 2–4)
GLUCOSE SERPL-MCNC: 85 MG/DL (ref 65–100)
HBA1C MFR BLD: 4.9 % (ref 4–5.6)
HCT VFR BLD AUTO: 40.9 % (ref 35–47)
HDLC SERPL-MCNC: 77 MG/DL
HDLC SERPL: 1.9 {RATIO} (ref 0–5)
HGB BLD-MCNC: 13.9 G/DL (ref 11.5–16)
LDLC SERPL CALC-MCNC: 50 MG/DL (ref 0–100)
MCH RBC QN AUTO: 33 PG (ref 26–34)
MCHC RBC AUTO-ENTMCNC: 34 G/DL (ref 30–36.5)
MCV RBC AUTO: 97.1 FL (ref 80–99)
NRBC # BLD: 0 K/UL (ref 0–0.01)
NRBC BLD-RTO: 0 PER 100 WBC
PLATELET # BLD AUTO: 258 K/UL (ref 150–400)
PMV BLD AUTO: 10.4 FL (ref 8.9–12.9)
POTASSIUM SERPL-SCNC: 4.1 MMOL/L (ref 3.5–5.1)
PROT SERPL-MCNC: 7.4 G/DL (ref 6.4–8.2)
RBC # BLD AUTO: 4.21 M/UL (ref 3.8–5.2)
SODIUM SERPL-SCNC: 137 MMOL/L (ref 136–145)
TRIGL SERPL-MCNC: 100 MG/DL (ref ?–150)
VLDLC SERPL CALC-MCNC: 20 MG/DL
WBC # BLD AUTO: 6.9 K/UL (ref 3.6–11)

## 2022-08-09 PROCEDURE — 99395 PREV VISIT EST AGE 18-39: CPT | Performed by: FAMILY MEDICINE

## 2022-08-09 RX ORDER — CYCLOBENZAPRINE HCL 10 MG
10 TABLET ORAL
Qty: 30 TABLET | Refills: 0 | Status: SHIPPED | OUTPATIENT
Start: 2022-08-09

## 2022-08-09 NOTE — PROGRESS NOTES
Patient Name: Xochitl Miranda   MRN: [id number]    Amy Montes is a 29 y.o. female who presents with the following:     Cervical Cancer Screening: up to date. 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 Needs TB testing for rotations. Requesting refill of Flexeril which she uses prn for back pain due to scoliosis. Review of Systems   Constitutional:  Negative for fever, malaise/fatigue and weight loss. Respiratory:  Negative for cough, hemoptysis, shortness of breath and wheezing. Cardiovascular:  Negative for chest pain, palpitations, leg swelling and PND. Gastrointestinal:  Negative for abdominal pain, constipation, diarrhea, nausea and vomiting. The patient's medications, allergies, past medical history, surgical history, family history and social history were reviewed and updated where appropriate.       Current Outpatient Medications:     spironolactone (ALDACTONE) 50 mg tablet, TAKE 1 TABLET BY MOUTH EVERY DAY, Disp: 90 Tablet, Rfl: 0    norgestimate-ethinyl estradioL (Tri-Sprintec, 28,) 0.18/0.215/0.25 mg-35 mcg (28) tab, TAKE 1 TABLET BY MOUTH EVERY DAY, Disp: 3 Each, Rfl: 1    aluminum chloride (Drysol) 20 % external solution, Apply to affected area nightly, Disp: 60 mL, Rfl: 2    Allergies   Allergen Reactions    Minocycline Other (comments)     Tingling in hands and sun sensitivity         OBJECTIVE    Visit Vitals  BP 112/64 (BP 1 Location: Left upper arm, BP Patient Position: Sitting, BP Cuff Size: Adult)   Pulse 73   Temp 98.7 °F (37.1 °C) (Temporal)   Resp 16   Ht 5' 11\" (1.803 m)   Wt 145 lb 12.8 oz (66.1 kg)   SpO2 98%   BMI 20.33 kg/m²       Physical Exam  Constitutional:       General: She is not in acute distress. Appearance: She is not diaphoretic. HENT:      Head: Normocephalic. Right Ear: External ear normal.      Left Ear: External ear normal.   Eyes:      Conjunctiva/sclera: Conjunctivae normal.      Pupils: Pupils are equal, round, and reactive to light. Cardiovascular:      Rate and Rhythm: Normal rate and regular rhythm. Heart sounds: Normal heart sounds. No murmur heard. No friction rub. No gallop. Pulmonary:      Effort: Pulmonary effort is normal. No respiratory distress. Breath sounds: Normal breath sounds. No wheezing or rales. Abdominal:      General: Bowel sounds are normal. There is no distension. Palpations: Abdomen is soft. Tenderness: There is no abdominal tenderness. There is no guarding or rebound. Skin:     General: Skin is warm and dry. Neurological:      Mental Status: She is alert. ASSESSMENT AND PLAN  Lizandro Weber is a 29 y.o. female who presents today for:    1. Routine general medical examination at health care facility  Reviewed age appropriate screening tests as recommended by the USPSTF Preventive Services Database with patient today.  - HEMOGLOBIN A1C WITH EAG; Future  - CBC W/O DIFF; Future  - METABOLIC PANEL, COMPREHENSIVE; Future  - LIPID PANEL; Future  - QUANTIFERON-TB GOLD PLUS; Future    2. Screening for diabetes mellitus  - HEMOGLOBIN A1C WITH EAG; Future    3. Screening for lipid disorders  - CBC W/O DIFF; Future  - METABOLIC PANEL, COMPREHENSIVE; Future  - LIPID PANEL; Future    4. Screening-pulmonary   - QUANTIFERON-TB GOLD PLUS; Future    5.  Other idiopathic scoliosis, thoracolumbar region  - cyclobenzaprine (FLEXERIL) 10 mg tablet; Take 1 Tablet by mouth daily as needed for Muscle Spasm(s). Dispense: 30 Tablet; Refill: 0      There are no discontinued medications. Treatment risks/benefits/costs/interactions/alternatives discussed with patient. Advised patient to call back or return to office if symptoms worsen/change/persist. If patient cannot reach us or should anything more severe/urgent arise he/she should proceed directly to the nearest emergency department. Discussed expected course/resolution/complications of diagnosis in detail with patient. Patient expressed understanding with the diagnosis and plan. This dictation may have been completed with Dragon, the [employer] voice recognition software. Unanticipated grammatical, syntax, homophones, and other interpretive errors are sometimes inadvertently transcribed by the computer software. Please disregard any errors that have escaped final proofreading. Nicole Sales M.D.

## 2022-08-09 NOTE — PROGRESS NOTES
Chief Complaint   Patient presents with    Complete Physical    Medication Refill     Would like flexeril refilled; last filled in 2020 - has scoliosis and uses PRN          1. Have you been to the ER, urgent care clinic since your last visit? Hospitalized since your last visit? No    2. Have you seen or consulted any other health care providers outside of the [address] [place] [address] since your last visit? Include any pap smears or colon screening. No    3. For patients over 45: Has the patient had a colonoscopy? NA - based on age     If the patient is female:    4. For patients over 36: Has the patient had a mammogram? NA - based on age    11. For patients over 21: Has the patient had a pap smear? Yes - no Care Gap present    3 most recent PHQ Screens 8/9/2022   Little interest or pleasure in doing things Not at all   Feeling down, depressed, irritable, or hopeless Not at all   Total Score PHQ 2 0       Abuse Screening Questionnaire 8/9/2022   Do you ever feel afraid of your partner? N   Are you in a relationship with someone who physically or mentally threatens you? N   Is it safe for you to go home?  Magda Hunter

## 2022-08-17 LAB
M TB IFN-G BLD-IMP: NEGATIVE
QUANTIFERON CRITERIA, QFI1T: NORMAL
QUANTIFERON MITOGEN VALUE: >10 IU/ML
QUANTIFERON NIL VALUE: 0 IU/ML
QUANTIFERON TB1 AG: 0 IU/ML
QUANTIFERON TB2 AG: 0 IU/ML

## 2022-09-09 ENCOUNTER — PATIENT MESSAGE (OUTPATIENT)
Dept: FAMILY MEDICINE CLINIC | Age: 32
End: 2022-09-09

## 2022-09-09 DIAGNOSIS — L70.0 CYSTIC ACNE: ICD-10-CM

## 2022-09-09 RX ORDER — SPIRONOLACTONE 50 MG/1
50 TABLET, FILM COATED ORAL DAILY
Qty: 90 TABLET | Refills: 3 | Status: SHIPPED | OUTPATIENT
Start: 2022-09-09

## 2022-09-09 RX ORDER — NORGESTIMATE AND ETHINYL ESTRADIOL 7DAYSX3 28
KIT ORAL
Qty: 3 EACH | Refills: 4 | Status: SHIPPED | OUTPATIENT
Start: 2022-09-09

## 2023-05-21 RX ORDER — CYCLOBENZAPRINE HCL 10 MG
10 TABLET ORAL DAILY PRN
COMMUNITY
Start: 2022-08-09

## 2023-05-21 RX ORDER — SPIRONOLACTONE 50 MG/1
50 TABLET, FILM COATED ORAL DAILY
COMMUNITY
Start: 2022-09-09

## 2023-05-21 RX ORDER — NORGESTIMATE AND ETHINYL ESTRADIOL 7DAYSX3 28
1 KIT ORAL DAILY
COMMUNITY
Start: 2022-09-09

## 2023-11-02 ENCOUNTER — TELEPHONE (OUTPATIENT)
Age: 33
End: 2023-11-02

## 2023-11-02 NOTE — TELEPHONE ENCOUNTER
[facility] office on 11.2.23 when pt calls back please inform her that we had received a request for her Birth Control.However before it can be approved  need's the pt to make an In Office Appt.  -VBN

## 2023-11-02 NOTE — TELEPHONE ENCOUNTER
----- Message from Ksasi Hendricks sent at 11/2/2023 12:02 PM EDT -----  Subject: Message to Provider    QUESTIONS  Information for Provider? pt was trying to return phone call   ---------------------------------------------------------------------------  --------------  CALL BACK INFO  [phone]; OK to leave message on voicemail  ---------------------------------------------------------------------------  --------------  SCRIPT ANSWERS  undefined

## 2023-11-02 NOTE — TELEPHONE ENCOUNTER
PCP: Ag Gilbert MD    Last appt: 8/9/2022       No future appointments.    Requested Prescriptions     Pending Prescriptions Disp Refills    TRI-ESTARYLLA 0.18/0.215/0.25 MG-35 MCG TABS [Pharmacy Med Name: TRI-ESTARYLLA TABLET] 84 tablet 2     Sig: TAKE 1 TABLET BY MOUTH EVERY DAY       Prior labs and Blood pressures:  BP Readings from Last 3 Encounters:   08/09/22 112/64   08/02/21 98/60     Lab Results   Component Value Date/Time     08/09/2022 10:33 AM    K 4.1 08/09/2022 10:33 AM     08/09/2022 10:33 AM    CO2 27 08/09/2022 10:33 AM    BUN 14 08/09/2022 10:33 AM    GFRAA >60 08/09/2022 10:33 AM     No results found for: \"HBA1C\", \"[id number]\"  Lab Results   Component Value Date/Time    CHOL 147 08/09/2022 10:33 AM    HDL 77 08/09/2022 10:33 AM     No results found for: \"VITD3\", \"VD3RIA\"    Lab Results   Component Value Date/Time    TSH 1.400 07/02/2020 03:01 PM

## 2023-11-02 NOTE — TELEPHONE ENCOUNTER
MD Gilbert,    TALIB:    Patient called for the refill of BC pills.  Noted that patient requires OV for this refill.  Noted she missed call from the office.    Contacted patient and advised appt required to be scheduled for refill, and MD Gilbert would fill rx til appt.  Just needed to call to schedule.    Patient stated she did see GYN in September and can ask them to fill as she is due for a pill on Sunday.      Patient advised will callback if GYN cannot fill.  Maria Elena Balderas          For Pharmacy Admin Tracking Only    Program: Medication Refill  CPA in place:    Recommendation Provided To:   Intervention Detail: New Rx: 1, reason: Patient Preference  Intervention Accepted By:   Gap Closed?:    Time Spent (min): 5

## 2023-11-09 NOTE — TELEPHONE ENCOUNTER
[facility] office on 11.9.23 when pt calls back please inform her that we had received a request for her Birth Control.However before it can be approved  need's the pt to make an In Office Appt.  -VBN

## 2023-11-14 ENCOUNTER — TELEPHONE (OUTPATIENT)
Age: 33
End: 2023-11-14

## 2023-11-14 RX ORDER — NORGESTIMATE AND ETHINYL ESTRADIOL 7DAYSX3 28
1 KIT ORAL DAILY
Qty: 84 TABLET | Refills: 0 | Status: SHIPPED | OUTPATIENT
Start: 2023-11-14

## 2023-11-14 NOTE — TELEPHONE ENCOUNTER
Left VM for pt to call office back. If pt calls back she would like to schedule her annual physical with Dr. Gilbert. Please schedule pt next available.-TM 11/14/23

## 2023-11-14 NOTE — TELEPHONE ENCOUNTER
----- Message from Laurence Rodrigues sent at 11/14/2023  9:26 AM EST -----  Subject: Appointment Request    Reason for Call: Established Patient Appointment needed: Routine Physical   Exam    QUESTIONS    Reason for appointment request? No appointments available during search     Additional Information for Provider? Pt needs to schedule yearly Physical.   Please contact Pt.  ---------------------------------------------------------------------------  --------------  CALL BACK INFO  [phone]; OK to leave message on voicemail  ---------------------------------------------------------------------------  --------------  SCRIPT ANSWERS

## 2023-12-28 ENCOUNTER — OFFICE VISIT (OUTPATIENT)
Age: 33
End: 2023-12-28
Payer: COMMERCIAL

## 2023-12-28 VITALS
WEIGHT: 150 LBS | HEIGHT: 71 IN | SYSTOLIC BLOOD PRESSURE: 111 MMHG | DIASTOLIC BLOOD PRESSURE: 71 MMHG | HEART RATE: 64 BPM | BODY MASS INDEX: 21 KG/M2 | TEMPERATURE: 97.7 F | OXYGEN SATURATION: 100 %

## 2023-12-28 DIAGNOSIS — Z13.220 SCREENING FOR LIPID DISORDERS: ICD-10-CM

## 2023-12-28 DIAGNOSIS — Z13.1 SCREENING FOR DIABETES MELLITUS: ICD-10-CM

## 2023-12-28 DIAGNOSIS — Z00.00 ROUTINE GENERAL MEDICAL EXAMINATION AT HEALTH CARE FACILITY: Primary | ICD-10-CM

## 2023-12-28 LAB
ALBUMIN SERPL-MCNC: 4 G/DL (ref 3.5–5)
ALBUMIN/GLOB SERPL: 1.2 (ref 1.1–2.2)
ALP SERPL-CCNC: 43 U/L (ref 45–117)
ALT SERPL-CCNC: 21 U/L (ref 12–78)
ANION GAP SERPL CALC-SCNC: 3 MMOL/L (ref 5–15)
AST SERPL-CCNC: 25 U/L (ref 15–37)
BILIRUB SERPL-MCNC: 0.3 MG/DL (ref 0.2–1)
BUN SERPL-MCNC: 14 MG/DL (ref 6–20)
BUN/CREAT SERPL: 19 (ref 12–20)
CALCIUM SERPL-MCNC: 9.1 MG/DL (ref 8.5–10.1)
CHLORIDE SERPL-SCNC: 105 MMOL/L (ref 97–108)
CHOLEST SERPL-MCNC: 156 MG/DL
CO2 SERPL-SCNC: 31 MMOL/L (ref 21–32)
CREAT SERPL-MCNC: 0.72 MG/DL (ref 0.55–1.02)
ERYTHROCYTE [DISTWIDTH] IN BLOOD BY AUTOMATED COUNT: 11.9 % (ref 11.5–14.5)
EST. AVERAGE GLUCOSE BLD GHB EST-MCNC: 91 MG/DL
GLOBULIN SER CALC-MCNC: 3.4 G/DL (ref 2–4)
GLUCOSE SERPL-MCNC: 82 MG/DL (ref 65–100)
HBA1C MFR BLD: 4.8 % (ref 4–5.6)
HCT VFR BLD AUTO: 38.7 % (ref 35–47)
HDLC SERPL-MCNC: 81 MG/DL
HDLC SERPL: 1.9 (ref 0–5)
HGB BLD-MCNC: 13.5 G/DL (ref 11.5–16)
LDLC SERPL CALC-MCNC: 62 MG/DL (ref 0–100)
MCH RBC QN AUTO: 33.2 PG (ref 26–34)
MCHC RBC AUTO-ENTMCNC: 34.9 G/DL (ref 30–36.5)
MCV RBC AUTO: 95.1 FL (ref 80–99)
NRBC # BLD: 0 K/UL (ref 0–0.01)
NRBC BLD-RTO: 0 PER 100 WBC
PLATELET # BLD AUTO: 239 K/UL (ref 150–400)
PMV BLD AUTO: 10.2 FL (ref 8.9–12.9)
POTASSIUM SERPL-SCNC: 3.9 MMOL/L (ref 3.5–5.1)
PROT SERPL-MCNC: 7.4 G/DL (ref 6.4–8.2)
RBC # BLD AUTO: 4.07 M/UL (ref 3.8–5.2)
SODIUM SERPL-SCNC: 139 MMOL/L (ref 136–145)
TRIGL SERPL-MCNC: 65 MG/DL
VLDLC SERPL CALC-MCNC: 13 MG/DL
WBC # BLD AUTO: 7.3 K/UL (ref 3.6–11)

## 2023-12-28 PROCEDURE — 99395 PREV VISIT EST AGE 18-39: CPT | Performed by: FAMILY MEDICINE

## 2023-12-28 NOTE — PROGRESS NOTES
Chief Complaint   Patient presents with    Medication Refill     Patient stopped spironolactone in 9/2023. Would like to discuss. Patient is not fasting and has had a flu shot alredy this season. 1. \"Have you been to the ER, urgent care clinic since your last visit? Hospitalized since your last visit? \"   no     2. \"Have you seen or consulted any other health care providers outside of the [address] [place] [address] since your last visit? \"  no                    12/28/2023     3:00 PM   PHQ-9    Little interest or pleasure in doing things 0   Feeling down, depressed, or hopeless 0   PHQ-2 Score 0   PHQ-9 Total Score 0           Financial Resource Strain: Low Risk  (12/28/2023)    Overall Financial Resource Strain (CARDIA)     Difficulty of Paying Living Expenses: Not hard at all      Food Insecurity: No Food Insecurity (12/28/2023)    Hunger Vital Sign     Worried About Running Out of Food in the Last Year: Never true     Ran Out of Food in the Last Year: Never true          Health Maintenance Due   Topic Date Due    Varicella vaccine (1 of 2 - 2-dose childhood series) Never done    Flu vaccine (1) 08/01/2023    Depression Screen  08/09/2023    COVID-19 Vaccine (3 - 2023-24 season) 09/01/2023
interpretive errors are sometimes inadvertently transcribed by the computer software. Please disregard any errors that have escaped final proofreading. Ag Tim M.D.

## 2024-11-26 ENCOUNTER — TELEMEDICINE (OUTPATIENT)
Age: 34
End: 2024-11-26
Payer: COMMERCIAL

## 2024-11-26 DIAGNOSIS — R05.2 SUBACUTE COUGH: Primary | ICD-10-CM

## 2024-11-26 PROCEDURE — 99213 OFFICE O/P EST LOW 20 MIN: CPT | Performed by: FAMILY MEDICINE

## 2024-11-26 RX ORDER — AZITHROMYCIN 250 MG/1
TABLET, FILM COATED ORAL
Qty: 6 TABLET | Refills: 0 | Status: SHIPPED | OUTPATIENT
Start: 2024-11-26 | End: 2024-12-06

## 2024-11-26 RX ORDER — FLUTICASONE PROPIONATE 50 MCG
SPRAY, SUSPENSION (ML) NASAL
COMMUNITY
Start: 2024-11-02

## 2024-11-26 ASSESSMENT — ENCOUNTER SYMPTOMS
DIARRHEA: 0
ABDOMINAL PAIN: 0
COUGH: 1
SHORTNESS OF BREATH: 0
CONSTIPATION: 0
WHEEZING: 0
VOMITING: 0
CHEST TIGHTNESS: 0
NAUSEA: 0

## 2024-11-26 NOTE — PROGRESS NOTES
HENT: [x] Normocephalic, atraumatic  [] Abnormal -   [] Mouth/Throat: Mucous membranes are moist    Pulmonary/Chest: [x] Respiratory effort normal   [x] No visualized signs of difficulty breathing or respiratory distress        [] Abnormal -         Skin:        [x] No significant exanthematous lesions or discoloration noted on facial skin         [] Abnormal -            Psychiatric:       [x] Normal Affect [] Abnormal -     Other pertinent observable physical exam findings:    Due to this being a TeleHealth evaluation, some elements of the physical examination are unable to be assessed.